# Patient Record
Sex: MALE | Race: WHITE | NOT HISPANIC OR LATINO | Employment: OTHER | URBAN - METROPOLITAN AREA
[De-identification: names, ages, dates, MRNs, and addresses within clinical notes are randomized per-mention and may not be internally consistent; named-entity substitution may affect disease eponyms.]

---

## 2023-09-15 ENCOUNTER — HOSPITAL ENCOUNTER (OUTPATIENT)
Dept: DATA CONVERSION | Facility: HOSPITAL | Age: 63
Discharge: HOME | End: 2023-09-15
Payer: MEDICARE

## 2023-09-15 DIAGNOSIS — E11.9 TYPE 2 DIABETES MELLITUS WITHOUT COMPLICATIONS (MULTI): ICD-10-CM

## 2023-09-15 DIAGNOSIS — E78.5 HYPERLIPIDEMIA, UNSPECIFIED: ICD-10-CM

## 2023-09-15 DIAGNOSIS — I10 ESSENTIAL (PRIMARY) HYPERTENSION: ICD-10-CM

## 2023-09-15 DIAGNOSIS — R73.01 IMPAIRED FASTING GLUCOSE: ICD-10-CM

## 2023-09-15 DIAGNOSIS — Z12.5 ENCOUNTER FOR SCREENING FOR MALIGNANT NEOPLASM OF PROSTATE: ICD-10-CM

## 2023-09-15 DIAGNOSIS — E03.9 HYPOTHYROIDISM, UNSPECIFIED: ICD-10-CM

## 2023-09-15 LAB
ALBUMIN SERPL-MCNC: 3.3 GM/DL (ref 3.5–5)
ALBUMIN/GLOB SERPL: 1.1 RATIO (ref 1.5–3)
ALP BLD-CCNC: 131 U/L (ref 35–125)
ALT SERPL-CCNC: 23 U/L (ref 5–40)
ANION GAP SERPL CALCULATED.3IONS-SCNC: 13 MMOL/L (ref 0–19)
APPEARANCE PLAS: NORMAL
AST SERPL-CCNC: 33 U/L (ref 5–40)
BACTERIA UR QL AUTO: NEGATIVE
BASOPHILS # BLD AUTO: 0.04 K/UL (ref 0–0.22)
BASOPHILS NFR BLD AUTO: 0.6 % (ref 0–1)
BILIRUB SERPL-MCNC: 1.2 MG/DL (ref 0.1–1.2)
BILIRUB UR QL STRIP.AUTO: NEGATIVE
BUN SERPL-MCNC: 18 MG/DL (ref 8–25)
BUN/CREAT SERPL: 13.8 RATIO (ref 8–21)
CALCIUM SERPL-MCNC: 9 MG/DL (ref 8.5–10.4)
CHLORIDE SERPL-SCNC: 103 MMOL/L (ref 97–107)
CHOLEST SERPL-MCNC: 149 MG/DL (ref 133–200)
CHOLEST/HDLC SERPL: 3.1 RATIO
CLARITY UR: CLEAR
CO2 SERPL-SCNC: 22 MMOL/L (ref 24–31)
COLOR SPUN FLD: NORMAL
COLOR UR: YELLOW
CREAT SERPL-MCNC: 1.3 MG/DL (ref 0.4–1.6)
DEPRECATED RDW RBC AUTO: 51.3 FL (ref 37–54)
DIFFERENTIAL METHOD BLD: ABNORMAL
EOSINOPHIL # BLD AUTO: 0.15 K/UL (ref 0–0.45)
EOSINOPHIL NFR BLD: 2.4 % (ref 0–3)
ERYTHROCYTE [DISTWIDTH] IN BLOOD BY AUTOMATED COUNT: 17.2 % (ref 11.7–15)
FASTING STATUS PATIENT QL REPORTED: NORMAL
GFR SERPL CREATININE-BSD FRML MDRD: 63 ML/MIN/1.73 M2
GLOBULIN SER-MCNC: 3.1 G/DL (ref 1.9–3.7)
GLUCOSE SERPL-MCNC: 122 MG/DL (ref 65–99)
GLUCOSE UR STRIP.AUTO-MCNC: NEGATIVE MG/DL
HBA1C MFR BLD: 4.8 % (ref 4–6)
HCT VFR BLD AUTO: 33.5 % (ref 41–50)
HDLC SERPL-MCNC: 48 MG/DL
HGB BLD-MCNC: 10.9 GM/DL (ref 13.5–16.5)
HGB UR QL STRIP.AUTO: 6 /HPF (ref 0–3)
HGB UR QL: NEGATIVE
HYALINE CASTS UR QL AUTO: 3 /LPF
IMM GRANULOCYTES # BLD AUTO: 0.02 K/UL (ref 0–0.1)
KETONES UR QL STRIP.AUTO: ABNORMAL
LDLC SERPL CALC-MCNC: 85 MG/DL (ref 65–130)
LEUKOCYTE ESTERASE UR QL STRIP.AUTO: NEGATIVE
LYMPHOCYTES # BLD AUTO: 0.64 K/UL (ref 1.2–3.2)
LYMPHOCYTES NFR BLD MANUAL: 10.3 % (ref 20–40)
MCH RBC QN AUTO: 26.8 PG (ref 26–34)
MCHC RBC AUTO-ENTMCNC: 32.5 % (ref 31–37)
MCV RBC AUTO: 82.3 FL (ref 80–100)
MONOCYTES # BLD AUTO: 0.72 K/UL (ref 0–0.8)
MONOCYTES NFR BLD MANUAL: 11.6 % (ref 0–8)
NEUTROPHILS # BLD AUTO: 4.63 K/UL
NEUTROPHILS # BLD AUTO: 4.63 K/UL (ref 1.8–7.7)
NEUTROPHILS.IMMATURE NFR BLD: 0.3 % (ref 0–1)
NEUTS SEG NFR BLD: 74.8 % (ref 50–70)
NITRITE UR QL STRIP.AUTO: NEGATIVE
NRBC BLD-RTO: 0 /100 WBC
PH UR STRIP.AUTO: 6 [PH] (ref 4.6–8)
PLATELET # BLD AUTO: 102 K/UL (ref 150–450)
PMV BLD AUTO: 12 CU (ref 7–12.6)
POTASSIUM SERPL-SCNC: 3 MMOL/L (ref 3.4–5.1)
PROT SERPL-MCNC: 6.4 G/DL (ref 5.9–7.9)
PROT UR STRIP.AUTO-MCNC: ABNORMAL MG/DL
PSA SERPL-MCNC: 0.1 NG/ML (ref 0–4.1)
RBC # BLD AUTO: 4.07 M/UL (ref 4.5–5.5)
REFLEX MICROSCOPIC (UA): ABNORMAL
SODIUM SERPL-SCNC: 138 MMOL/L (ref 133–145)
SP GR UR STRIP.AUTO: 1.02 (ref 1–1.03)
SQUAMOUS UR QL AUTO: ABNORMAL /HPF
T4 FREE SERPL-MCNC: 1.6 NG/DL (ref 0.9–1.7)
TRIGL SERPL-MCNC: 80 MG/DL (ref 40–150)
TSH SERPL DL<=0.05 MIU/L-ACNC: 4.51 MIU/L (ref 0.27–4.2)
UROBILINOGEN UR QL STRIP.AUTO: 2 MG/DL (ref 0–1)
WBC # BLD AUTO: 6.2 K/UL (ref 4.5–11)
WBC #/AREA URNS AUTO: 1 /HPF (ref 0–3)

## 2023-10-31 ENCOUNTER — TELEPHONE (OUTPATIENT)
Dept: PRIMARY CARE | Facility: CLINIC | Age: 63
End: 2023-10-31
Payer: MEDICARE

## 2023-10-31 DIAGNOSIS — G89.29 OTHER CHRONIC PAIN: ICD-10-CM

## 2023-10-31 DIAGNOSIS — I10 ESSENTIAL (PRIMARY) HYPERTENSION: ICD-10-CM

## 2023-10-31 RX ORDER — TRAMADOL HYDROCHLORIDE 50 MG/1
50 TABLET ORAL EVERY 12 HOURS PRN
COMMUNITY
Start: 2023-08-08 | End: 2023-10-31 | Stop reason: SDUPTHER

## 2023-10-31 RX ORDER — METOPROLOL TARTRATE 25 MG/1
25 TABLET, FILM COATED ORAL 2 TIMES DAILY
COMMUNITY
Start: 2023-07-17 | End: 2023-10-31 | Stop reason: SDUPTHER

## 2023-10-31 RX ORDER — FEBUXOSTAT 40 MG/1
40 TABLET, FILM COATED ORAL DAILY
COMMUNITY
End: 2023-10-31 | Stop reason: SDUPTHER

## 2023-10-31 NOTE — TELEPHONE ENCOUNTER
Rx refill  Febuxostat 40 mg  Metoprolol 25 mg  Tramadol 50 mg  Pharmacy- [facility] , [place]

## 2023-11-01 RX ORDER — METOPROLOL TARTRATE 25 MG/1
25 TABLET, FILM COATED ORAL 2 TIMES DAILY
Qty: 90 TABLET | Refills: 1 | Status: SHIPPED | OUTPATIENT
Start: 2023-11-01 | End: 2024-02-05 | Stop reason: SDUPTHER

## 2023-11-01 RX ORDER — FEBUXOSTAT 40 MG/1
40 TABLET, FILM COATED ORAL DAILY
Qty: 90 TABLET | Refills: 1 | Status: SHIPPED | OUTPATIENT
Start: 2023-11-01 | End: 2024-05-06 | Stop reason: SDUPTHER

## 2023-11-01 RX ORDER — TRAMADOL HYDROCHLORIDE 50 MG/1
50 TABLET ORAL EVERY 12 HOURS PRN
Qty: 15 TABLET | Refills: 0 | Status: SHIPPED | OUTPATIENT
Start: 2023-11-01 | End: 2023-11-02 | Stop reason: SDUPTHER

## 2023-11-02 RX ORDER — TRAMADOL HYDROCHLORIDE 50 MG/1
50 TABLET ORAL EVERY 12 HOURS PRN
Qty: 60 TABLET | Refills: 0 | Status: SHIPPED | OUTPATIENT
Start: 2023-11-02 | End: 2024-01-19 | Stop reason: SDUPTHER

## 2023-11-02 NOTE — TELEPHONE ENCOUNTER
I wrote it for 60 pills as we always do. Not sure how it got changed. Resent with correct number of pills. Please let him know.

## 2023-12-04 ENCOUNTER — TELEPHONE (OUTPATIENT)
Dept: PRIMARY CARE | Facility: CLINIC | Age: 63
End: 2023-12-04
Payer: MEDICARE

## 2023-12-04 DIAGNOSIS — I10 ESSENTIAL (PRIMARY) HYPERTENSION: ICD-10-CM

## 2023-12-04 DIAGNOSIS — E55.9 VITAMIN D DEFICIENCY: ICD-10-CM

## 2023-12-04 RX ORDER — SPIRONOLACTONE 50 MG/1
50 TABLET, FILM COATED ORAL 2 TIMES DAILY
COMMUNITY
End: 2023-12-04 | Stop reason: SDUPTHER

## 2023-12-04 RX ORDER — CHOLECALCIFEROL (VITAMIN D3) 10 MCG
800 TABLET ORAL DAILY
Qty: 180 TABLET | Refills: 0 | Status: SHIPPED | OUTPATIENT
Start: 2023-12-04 | End: 2024-03-06 | Stop reason: SDUPTHER

## 2023-12-04 RX ORDER — SPIRONOLACTONE 50 MG/1
50 TABLET, FILM COATED ORAL 2 TIMES DAILY
Qty: 90 TABLET | Refills: 0 | Status: SHIPPED | OUTPATIENT
Start: 2023-12-04 | End: 2024-01-19 | Stop reason: SDUPTHER

## 2023-12-04 RX ORDER — CHOLECALCIFEROL (VITAMIN D3) 10 MCG
400 TABLET ORAL
COMMUNITY
Start: 2023-08-28 | End: 2023-12-04 | Stop reason: SDUPTHER

## 2023-12-04 NOTE — TELEPHONE ENCOUNTER
Patient would like refills on the following    Spirolactone 50mg once daily  Vitamin D 400units    [facility]

## 2023-12-18 ENCOUNTER — TELEPHONE (OUTPATIENT)
Dept: PRIMARY CARE | Facility: CLINIC | Age: 63
End: 2023-12-18
Payer: MEDICARE

## 2023-12-18 DIAGNOSIS — I10 ESSENTIAL (PRIMARY) HYPERTENSION: ICD-10-CM

## 2023-12-18 RX ORDER — FUROSEMIDE 80 MG/1
80 TABLET ORAL DAILY
Qty: 90 TABLET | Refills: 0 | Status: SHIPPED | OUTPATIENT
Start: 2023-12-18 | End: 2024-03-20 | Stop reason: SDUPTHER

## 2023-12-18 RX ORDER — FUROSEMIDE 80 MG/1
80 TABLET ORAL DAILY
COMMUNITY
End: 2023-12-18 | Stop reason: SDUPTHER

## 2024-01-02 ENCOUNTER — TELEPHONE (OUTPATIENT)
Dept: PRIMARY CARE | Facility: CLINIC | Age: 64
End: 2024-01-02
Payer: MEDICARE

## 2024-01-02 DIAGNOSIS — K21.9 GASTROESOPHAGEAL REFLUX DISEASE, UNSPECIFIED WHETHER ESOPHAGITIS PRESENT: ICD-10-CM

## 2024-01-02 RX ORDER — OMEPRAZOLE 40 MG/1
40 CAPSULE, DELAYED RELEASE ORAL 2 TIMES DAILY
COMMUNITY
End: 2024-01-02 | Stop reason: SDUPTHER

## 2024-01-02 RX ORDER — OMEPRAZOLE 40 MG/1
40 CAPSULE, DELAYED RELEASE ORAL 2 TIMES DAILY
Qty: 180 CAPSULE | Refills: 0 | Status: SHIPPED | OUTPATIENT
Start: 2024-01-02 | End: 2024-04-05 | Stop reason: SDUPTHER

## 2024-01-08 ENCOUNTER — APPOINTMENT (OUTPATIENT)
Dept: PRIMARY CARE | Facility: CLINIC | Age: 64
End: 2024-01-08
Payer: MEDICARE

## 2024-01-19 ENCOUNTER — TELEPHONE (OUTPATIENT)
Dept: PRIMARY CARE | Facility: CLINIC | Age: 64
End: 2024-01-19
Payer: MEDICARE

## 2024-01-19 DIAGNOSIS — I10 ESSENTIAL (PRIMARY) HYPERTENSION: Primary | ICD-10-CM

## 2024-01-19 DIAGNOSIS — I10 ESSENTIAL (PRIMARY) HYPERTENSION: ICD-10-CM

## 2024-01-19 DIAGNOSIS — D64.9 ANEMIA, UNSPECIFIED TYPE: ICD-10-CM

## 2024-01-19 DIAGNOSIS — G89.29 OTHER CHRONIC PAIN: ICD-10-CM

## 2024-01-19 RX ORDER — TRAMADOL HYDROCHLORIDE 50 MG/1
50 TABLET ORAL EVERY 12 HOURS PRN
Qty: 30 TABLET | Refills: 0 | Status: SHIPPED | OUTPATIENT
Start: 2024-01-19 | End: 2024-02-15 | Stop reason: SDUPTHER

## 2024-01-19 RX ORDER — SPIRONOLACTONE 50 MG/1
50 TABLET, FILM COATED ORAL 2 TIMES DAILY
Qty: 90 TABLET | Refills: 1 | Status: SHIPPED | OUTPATIENT
Start: 2024-01-19

## 2024-01-19 NOTE — TELEPHONE ENCOUNTER
Rx Refill Request Telephone Encounter    Name:  Vishnu Hassan  :  [id number]  Medication Name:  Tramadol 50 mg, Spironolactone 50 mg            [facility]  Date of last appointment:    Date of next appointment:    Best number to reach patient:

## 2024-01-29 ENCOUNTER — APPOINTMENT (OUTPATIENT)
Dept: PRIMARY CARE | Facility: CLINIC | Age: 64
End: 2024-01-29
Payer: MEDICARE

## 2024-02-05 DIAGNOSIS — I10 ESSENTIAL (PRIMARY) HYPERTENSION: ICD-10-CM

## 2024-02-05 NOTE — TELEPHONE ENCOUNTER
Rx Refill Request Telephone Encounter    Name:  Vishnu Hassan  :  [id number]  Medication Name:  Metoprolol 25 mg            [facility]  Date of last appointment:    Date of next appointment:    Best number to reach patient:

## 2024-02-06 RX ORDER — METOPROLOL TARTRATE 25 MG/1
25 TABLET, FILM COATED ORAL 2 TIMES DAILY
Qty: 180 TABLET | Refills: 0 | Status: SHIPPED | OUTPATIENT
Start: 2024-02-06 | End: 2024-05-06 | Stop reason: SDUPTHER

## 2024-02-07 ENCOUNTER — LAB (OUTPATIENT)
Dept: LAB | Facility: LAB | Age: 64
End: 2024-02-07
Payer: MEDICARE

## 2024-02-07 DIAGNOSIS — I10 ESSENTIAL (PRIMARY) HYPERTENSION: ICD-10-CM

## 2024-02-07 DIAGNOSIS — D64.9 ANEMIA, UNSPECIFIED TYPE: ICD-10-CM

## 2024-02-07 LAB
ALBUMIN SERPL-MCNC: 3.2 G/DL (ref 3.5–5)
ALP BLD-CCNC: 145 U/L (ref 35–125)
ALT SERPL-CCNC: 19 U/L (ref 5–40)
ANION GAP SERPL CALC-SCNC: 13 MMOL/L
AST SERPL-CCNC: 33 U/L (ref 5–40)
BASOPHILS # BLD AUTO: 0.06 X10*3/UL (ref 0–0.1)
BASOPHILS NFR BLD AUTO: 1.4 %
BILIRUB SERPL-MCNC: 1.1 MG/DL (ref 0.1–1.2)
BUN SERPL-MCNC: 16 MG/DL (ref 8–25)
CALCIUM SERPL-MCNC: 8.7 MG/DL (ref 8.5–10.4)
CHLORIDE SERPL-SCNC: 103 MMOL/L (ref 97–107)
CO2 SERPL-SCNC: 24 MMOL/L (ref 24–31)
CREAT SERPL-MCNC: 1.2 MG/DL (ref 0.4–1.6)
EGFRCR SERPLBLD CKD-EPI 2021: 69 ML/MIN/1.73M*2
EOSINOPHIL # BLD AUTO: 0.21 X10*3/UL (ref 0–0.7)
EOSINOPHIL NFR BLD AUTO: 4.9 %
ERYTHROCYTE [DISTWIDTH] IN BLOOD BY AUTOMATED COUNT: 16.6 % (ref 11.5–14.5)
FERRITIN SERPL-MCNC: 29 NG/ML (ref 30–400)
GLUCOSE SERPL-MCNC: 123 MG/DL (ref 65–99)
HCT VFR BLD AUTO: 31.3 % (ref 41–52)
HGB BLD-MCNC: 9.3 G/DL (ref 13.5–17.5)
IMM GRANULOCYTES # BLD AUTO: 0.04 X10*3/UL (ref 0–0.7)
IMM GRANULOCYTES NFR BLD AUTO: 0.9 % (ref 0–0.9)
IRON SATN MFR SERPL: 11 % (ref 12–50)
IRON SERPL-MCNC: 42 UG/DL (ref 45–160)
LYMPHOCYTES # BLD AUTO: 0.53 X10*3/UL (ref 1.2–4.8)
LYMPHOCYTES NFR BLD AUTO: 12.3 %
MCH RBC QN AUTO: 26.1 PG (ref 26–34)
MCHC RBC AUTO-ENTMCNC: 29.7 G/DL (ref 32–36)
MCV RBC AUTO: 88 FL (ref 80–100)
MONOCYTES # BLD AUTO: 0.38 X10*3/UL (ref 0.1–1)
MONOCYTES NFR BLD AUTO: 8.8 %
NEUTROPHILS # BLD AUTO: 3.09 X10*3/UL (ref 1.2–7.7)
NEUTROPHILS NFR BLD AUTO: 71.7 %
NRBC BLD-RTO: 0 /100 WBCS (ref 0–0)
PLATELET # BLD AUTO: 102 X10*3/UL (ref 150–450)
POTASSIUM SERPL-SCNC: 3.8 MMOL/L (ref 3.4–5.1)
PROT SERPL-MCNC: 5.8 G/DL (ref 5.9–7.9)
RBC # BLD AUTO: 3.57 X10*6/UL (ref 4.5–5.9)
SODIUM SERPL-SCNC: 140 MMOL/L (ref 133–145)
TIBC SERPL-MCNC: 366 UG/DL (ref 228–428)
UIBC SERPL-MCNC: 324 UG/DL (ref 110–370)
WBC # BLD AUTO: 4.3 X10*3/UL (ref 4.4–11.3)

## 2024-02-07 PROCEDURE — 83540 ASSAY OF IRON: CPT

## 2024-02-07 PROCEDURE — 80053 COMPREHEN METABOLIC PANEL: CPT

## 2024-02-07 PROCEDURE — 85025 COMPLETE CBC W/AUTO DIFF WBC: CPT

## 2024-02-07 PROCEDURE — 83550 IRON BINDING TEST: CPT

## 2024-02-07 PROCEDURE — 36415 COLL VENOUS BLD VENIPUNCTURE: CPT

## 2024-02-07 PROCEDURE — 82728 ASSAY OF FERRITIN: CPT

## 2024-02-15 ENCOUNTER — OFFICE VISIT (OUTPATIENT)
Dept: PRIMARY CARE | Facility: CLINIC | Age: 64
End: 2024-02-15
Payer: MEDICARE

## 2024-02-15 VITALS
DIASTOLIC BLOOD PRESSURE: 58 MMHG | HEIGHT: 65 IN | SYSTOLIC BLOOD PRESSURE: 108 MMHG | HEART RATE: 80 BPM | BODY MASS INDEX: 25.83 KG/M2 | OXYGEN SATURATION: 99 % | TEMPERATURE: 98.1 F | WEIGHT: 155 LBS

## 2024-02-15 DIAGNOSIS — G89.29 OTHER CHRONIC PAIN: ICD-10-CM

## 2024-02-15 DIAGNOSIS — I10 ESSENTIAL (PRIMARY) HYPERTENSION: Primary | ICD-10-CM

## 2024-02-15 DIAGNOSIS — D64.9 ANEMIA, UNSPECIFIED TYPE: ICD-10-CM

## 2024-02-15 PROCEDURE — 99214 OFFICE O/P EST MOD 30 MIN: CPT

## 2024-02-15 PROCEDURE — 80349 CANNABINOIDS NATURAL: CPT

## 2024-02-15 PROCEDURE — 3074F SYST BP LT 130 MM HG: CPT

## 2024-02-15 PROCEDURE — 80365 DRUG SCREENING OXYCODONE: CPT

## 2024-02-15 PROCEDURE — 80368 SEDATIVE HYPNOTICS: CPT

## 2024-02-15 PROCEDURE — 80373 DRUG SCREENING TRAMADOL: CPT

## 2024-02-15 PROCEDURE — 80307 DRUG TEST PRSMV CHEM ANLYZR: CPT

## 2024-02-15 PROCEDURE — 80346 BENZODIAZEPINES1-12: CPT

## 2024-02-15 PROCEDURE — 82570 ASSAY OF URINE CREATININE: CPT

## 2024-02-15 PROCEDURE — 3078F DIAST BP <80 MM HG: CPT

## 2024-02-15 PROCEDURE — 80354 DRUG SCREENING FENTANYL: CPT

## 2024-02-15 PROCEDURE — 80361 OPIATES 1 OR MORE: CPT

## 2024-02-15 PROCEDURE — 80358 DRUG SCREENING METHADONE: CPT

## 2024-02-15 PROCEDURE — 1036F TOBACCO NON-USER: CPT

## 2024-02-15 RX ORDER — TRAMADOL HYDROCHLORIDE 50 MG/1
50 TABLET ORAL EVERY 12 HOURS PRN
Qty: 30 TABLET | Refills: 0 | Status: SHIPPED | OUTPATIENT
Start: 2024-02-15 | End: 2024-03-20 | Stop reason: SDUPTHER

## 2024-02-15 RX ORDER — FERROUS GLUCONATE 325 MG
38 TABLET ORAL
COMMUNITY

## 2024-02-15 ASSESSMENT — PATIENT HEALTH QUESTIONNAIRE - PHQ9
1. LITTLE INTEREST OR PLEASURE IN DOING THINGS: NOT AT ALL
SUM OF ALL RESPONSES TO PHQ9 QUESTIONS 1 AND 2: 0
2. FEELING DOWN, DEPRESSED OR HOPELESS: NOT AT ALL

## 2024-02-15 ASSESSMENT — LIFESTYLE VARIABLES: TOTAL SCORE: 3

## 2024-02-15 ASSESSMENT — PAIN SCALES - GENERAL: PAINLEVEL: 6

## 2024-02-15 NOTE — PROGRESS NOTES
Subjective   Patient ID: Vishnu Hassan is a 63 y.o. male who presents for Hypertension.    HPI     Vishnu presents for HTN follow up and chronic pain management.     HTN- Metoprolol 25mg BID, Lasix 80mg, spironolactone 50mg. Tolerating medications well. Labs reviewed, no electrolyte imbalances.  Denies change in vision, headache, or dizziness.   No complaints of chest pain, palpitations, or shortness of breath.    CHRONIC PAIN -Takes Tramadol 50mg PRN for chronic arthritic pain s/p knee and hip replacements. Had Rt hip fracture 1/2023. Takes 1 pills per day.Was previously taking 1-2 daily. Feels pain well controlled on current regimen. Allows him to stay active, when pain goes untreated has difficulty with ambulation and completing his everyday activities.  Prognosis: chronic/long term  Last Controlled meds contract - 5/2022, updated today    HLD- no meds  IFG- monitoring  Hypothyroidism  Gout- febuxostat 40mg, colchicine 0.6mg  Anemia- supplement qod  Alcoholic cirrhosis- Lasix 80mg, spironolactone 50mg, lactulose PRN. Overdue for appt, will schedule- he states he tried to schedule but Dr. Whitaker was unavailable, but will call again  Hx prostate cancer  Chronic pain- Tramadol 1-2 tabs daily  GERD- omeprazole  Vit D deficiency- supplement      SPECIALISTS:  Ortho, Dr. Rodriguez/Dr. Phillips  GI, Dr. Whitaker [facility]- Alcoholic cirrhosis  Renal, Dr. Malhotra- CKD    OARRS:  Shayna Bond, DARRIUS-CNP on 2/15/2024 10:32 AM  I have personally reviewed the OARRS report for Vishnu Hassan. I have considered the risks of abuse, dependence, addiction and diversion    Is the patient prescribed a combination of a benzodiazepine and opioid?  No    Last Urine Drug Screen / ordered today: Yes  Recent Results (from the past 8760 hour(s))   Screen Opiate/Opioid/Benzo Prescription Compliance    Collection Time: 02/15/24 11:11 AM   Result Value Ref Range    Creatinine, Urine Random 21.0 20.0 - 370.0 mg/dL    Amphetamine Screen, Urine  Presumptive Negative Presumptive Negative    Barbiturate Screen, Urine Presumptive Negative Presumptive Negative    Cannabinoid Screen, Urine Presumptive Positive (A) Presumptive Negative    Cocaine Metabolite Screen, Urine Presumptive Negative Presumptive Negative    PCP Screen, Urine Presumptive Negative Presumptive Negative     N/A      Controlled Substance Agreement:  Date of the Last Agreement: 2/15/2024  Reviewed Controlled Substance Agreement including but not limited to the benefits, risks, and alternatives to treatment with a Controlled Substance medication(s).    Opioids:  What is the patient's goal of therapy? Pain management  Is this being achieved with current treatment? yes    I have calculated the patient's Morphine Dose Equivalent (MED):   I have considered referral to Pain Management and/or a specialist, and do not feel it is necessary at this time.    I feel that it is clinically indicated to continue this current medication regimen after consideration of alternative therapies, and other non-opioid treatment.    Opioid Risk Screening:  Family History of Substance Abuse  Alcohol: 3 (2/15/2024 10:00 AM)  Illegal Drugs: 0 (2/15/2024 10:00 AM)  Prescription Drugs: 0 (2/15/2024 10:00 AM)    Personal History of Substance Abuse  Alcohol: 0 (2/15/2024 10:00 AM)  Illegal Drugs: 0 (2/15/2024 10:00 AM)  Prescription Drugs: 0 (2/15/2024 10:00 AM)    Patient Age (16-45)  Age (16-45): 0 (2/15/2024 10:00 AM)    History of Preadolescent Sexual Abuse  History of Preadolescent Sexual Abuse: .0 (2/15/2024 10:00 AM)    Psychological Disease  Attention Deficit Disorder, Obsessive Compulsive Disorder, Bipolar, Schizophrenia: 0 (2/15/2024 10:00 AM)  Depression: 0 (2/15/2024 10:00 AM)    Total Score  Total Score: 3 (2/15/2024 10:00 AM)        Pain Assessment:  6/10, generalized     Review of Systems   Constitutional:  Negative for activity change, appetite change, fatigue and unexpected weight change.   HENT:  Negative  "for hearing loss.    Eyes:  Negative for visual disturbance.   Respiratory:  Negative for chest tightness and shortness of breath.    Cardiovascular:  Negative for chest pain and palpitations.   Gastrointestinal:  Negative for constipation, diarrhea, nausea and vomiting.   Genitourinary:  Negative for difficulty urinating, hematuria and urgency.   Musculoskeletal:  Positive for arthralgias (chronic arthritic pain b/l hips and knees). Negative for myalgias.   Skin:  Negative for color change.   Neurological:  Negative for dizziness, tremors, weakness, light-headedness and headaches.           Objective   /58 (BP Location: Left arm)   Pulse 80   Temp 36.7 °C (98.1 °F) (Temporal)   Ht 1.651 m (5' 5\")   Wt 70.3 kg (155 lb)   SpO2 99%   BMI 25.79 kg/m²     Physical Exam  Vitals and nursing note reviewed.   Constitutional:       General: He is not in acute distress.     Appearance: Normal appearance. He is normal weight. He is not ill-appearing.   Eyes:      Extraocular Movements: Extraocular movements intact.      Conjunctiva/sclera: Conjunctivae normal.      Pupils: Pupils are equal, round, and reactive to light.   Neck:      Vascular: No carotid bruit.   Cardiovascular:      Rate and Rhythm: Normal rate and regular rhythm.      Pulses: Normal pulses.      Heart sounds: Normal heart sounds, S1 normal and S2 normal. No murmur heard.  Pulmonary:      Effort: Pulmonary effort is normal. No respiratory distress.      Breath sounds: Normal breath sounds and air entry.   Musculoskeletal:      Cervical back: Normal range of motion and neck supple. No rigidity or tenderness.      Right lower leg: No edema.      Left lower leg: No edema.   Lymphadenopathy:      Cervical: No cervical adenopathy.   Skin:     General: Skin is warm and dry.      Capillary Refill: Capillary refill takes less than 2 seconds.   Neurological:      General: No focal deficit present.      Mental Status: He is alert. Mental status is at " baseline.   Psychiatric:         Mood and Affect: Mood normal.         Behavior: Behavior normal. Behavior is cooperative.         Thought Content: Thought content normal.         Judgment: Judgment normal.         Assessment/Plan   Problem List Items Addressed This Visit             ICD-10-CM    Essential (primary) hypertension - Primary  Blood pressure well controlled.   Continue metoprolol for hypertension. Continue to monitor blood pressure.  Call if blood pressure consistently >140/90.  Non pharmacological interventions such as low salt, cardiac diet discussed.  Increase physical activity as able.  Stress reduction interventions discussed.  Discussed signs and symptoms of major cardiovascular event and need to present to the ED.   Reevaluate in 6 months.  Patient verbalizes understanding regarding plan of care and all questions answered.   I10    Chronic pain  Reviewed risks, benefits and expected treatment course.  Discussed goals of maintaining daily function while maintaining a manageable pain level to allow you to go about your daily activities effectively.  You may always have some level of pain, but our goal is to find a tolerable level for you without adding unnecessary dangerous medications.  Follow up in 3 months for re-evaluation.  G89.29    Relevant Medications    traMADol (Ultram) 50 mg tablet    Other Relevant Orders    Opiate/Opioid/Benzo Prescription Compliance    OOB Internal Tracking    THC (Marijuana), Urine, Confirmation     Anemia  Labs reviewed with patient.  Increase iron supplement to daily from QOD as discussed.  Explained intended effects, potential side effects, and schedule of dosages of the medication.  Follow up in 6 months.

## 2024-02-16 LAB
AMPHETAMINES UR QL SCN: ABNORMAL
BARBITURATES UR QL SCN: ABNORMAL
BZE UR QL SCN: ABNORMAL
CANNABINOIDS UR QL SCN: ABNORMAL
CREAT UR-MCNC: 21 MG/DL (ref 20–370)
PCP UR QL SCN: ABNORMAL

## 2024-02-18 ASSESSMENT — ENCOUNTER SYMPTOMS
WEAKNESS: 0
DIZZINESS: 0
COLOR CHANGE: 0
VOMITING: 0
FATIGUE: 0
ACTIVITY CHANGE: 0
NAUSEA: 0
CONSTIPATION: 0
TREMORS: 0
CHEST TIGHTNESS: 0
APPETITE CHANGE: 0
HEMATURIA: 0
DIFFICULTY URINATING: 0
MYALGIAS: 0
DIARRHEA: 0
ARTHRALGIAS: 1
LIGHT-HEADEDNESS: 0
UNEXPECTED WEIGHT CHANGE: 0
SHORTNESS OF BREATH: 0
PALPITATIONS: 0
HEADACHES: 0

## 2024-02-23 LAB
1OH-MIDAZOLAM UR CFM-MCNC: <25 NG/ML
6MAM UR CFM-MCNC: <25 NG/ML
7AMINOCLONAZEPAM UR CFM-MCNC: <25 NG/ML
A-OH ALPRAZ UR CFM-MCNC: <25 NG/ML
ALPRAZ UR CFM-MCNC: <25 NG/ML
CHLORDIAZEP UR CFM-MCNC: <25 NG/ML
CLONAZEPAM UR CFM-MCNC: <25 NG/ML
CODEINE UR CFM-MCNC: <50 NG/ML
DIAZEPAM UR CFM-MCNC: <25 NG/ML
EDDP UR CFM-MCNC: <25 NG/ML
FENTANYL UR CFM-MCNC: <2.5 NG/ML
HYDROCODONE CTO UR CFM-MCNC: <25 NG/ML
HYDROMORPHONE UR CFM-MCNC: <25 NG/ML
LORAZEPAM UR CFM-MCNC: <25 NG/ML
METHADONE UR CFM-MCNC: <25 NG/ML
MIDAZOLAM UR CFM-MCNC: <25 NG/ML
MORPHINE UR CFM-MCNC: <50 NG/ML
NORDIAZEPAM UR CFM-MCNC: <25 NG/ML
NORFENTANYL UR CFM-MCNC: <2.5 NG/ML
NORHYDROCODONE UR CFM-MCNC: <25 NG/ML
NOROXYCODONE UR CFM-MCNC: <25 NG/ML
NORTRAMADOL UR-MCNC: >1000 NG/ML
OXAZEPAM UR CFM-MCNC: <25 NG/ML
OXYCODONE UR CFM-MCNC: <25 NG/ML
OXYMORPHONE UR CFM-MCNC: <25 NG/ML
TEMAZEPAM UR CFM-MCNC: <25 NG/ML
TRAMADOL UR CFM-MCNC: >1000 NG/ML
ZOLPIDEM UR CFM-MCNC: <25 NG/ML
ZOLPIDEM UR-MCNC: <25 NG/ML

## 2024-02-24 LAB — CARBOXYTHC UR-MCNC: 33 NG/ML

## 2024-02-28 ENCOUNTER — TELEPHONE (OUTPATIENT)
Dept: PRIMARY CARE | Facility: CLINIC | Age: 64
End: 2024-02-28
Payer: MEDICARE

## 2024-03-06 ENCOUNTER — TELEPHONE (OUTPATIENT)
Dept: PRIMARY CARE | Facility: CLINIC | Age: 64
End: 2024-03-06
Payer: MEDICARE

## 2024-03-06 DIAGNOSIS — E55.9 VITAMIN D DEFICIENCY: ICD-10-CM

## 2024-03-06 RX ORDER — CHOLECALCIFEROL (VITAMIN D3) 10 MCG
800 TABLET ORAL DAILY
Qty: 180 TABLET | Refills: 0 | Status: SHIPPED | OUTPATIENT
Start: 2024-03-06 | End: 2024-06-04

## 2024-03-06 NOTE — TELEPHONE ENCOUNTER
Patient would like a refill on Vitamin D 400 units    Pharmacy: [facility] in [place]    Contact Number: Mobile

## 2024-03-20 ENCOUNTER — TELEPHONE (OUTPATIENT)
Dept: PRIMARY CARE | Facility: CLINIC | Age: 64
End: 2024-03-20
Payer: MEDICARE

## 2024-03-20 DIAGNOSIS — G89.29 OTHER CHRONIC PAIN: ICD-10-CM

## 2024-03-20 DIAGNOSIS — I10 ESSENTIAL (PRIMARY) HYPERTENSION: ICD-10-CM

## 2024-03-20 RX ORDER — FUROSEMIDE 80 MG/1
80 TABLET ORAL DAILY
Qty: 90 TABLET | Refills: 0 | Status: SHIPPED | OUTPATIENT
Start: 2024-03-20

## 2024-03-20 RX ORDER — TRAMADOL HYDROCHLORIDE 50 MG/1
50 TABLET ORAL EVERY 12 HOURS PRN
Qty: 30 TABLET | Refills: 0 | Status: SHIPPED | OUTPATIENT
Start: 2024-03-20 | End: 2024-04-22 | Stop reason: SDUPTHER

## 2024-03-20 NOTE — TELEPHONE ENCOUNTER
Rx Refill Request Telephone Encounter    Name:  Vishnu MOORE Sonya  :  [id number]  Medication Name:  Furosemide 80 mg. Tramadol 50 mg. (He is aware [facility] is out)            Specific Pharmacy location:  [facility]  Date of last appointment:    Date of next appointment:    Best number to reach patient:

## 2024-03-20 NOTE — TELEPHONE ENCOUNTER
I personally reviewed the OARRS report for this patient and found no concern for abuse, dependence, or diversion. I believe this medication is clinically appropriate for this patient. Refill sent per patient request.

## 2024-04-05 DIAGNOSIS — K21.9 GASTROESOPHAGEAL REFLUX DISEASE, UNSPECIFIED WHETHER ESOPHAGITIS PRESENT: ICD-10-CM

## 2024-04-05 NOTE — TELEPHONE ENCOUNTER
Refill : Omeprazole 40 mg    Generic Colcrys .6 mg quantity 30. It has to be quantity of 30 and generic to be covered .     [facility] in [place]

## 2024-04-08 RX ORDER — OMEPRAZOLE 40 MG/1
40 CAPSULE, DELAYED RELEASE ORAL 2 TIMES DAILY
Qty: 180 CAPSULE | Refills: 0 | Status: SHIPPED | OUTPATIENT
Start: 2024-04-08

## 2024-04-08 NOTE — TELEPHONE ENCOUNTER
Patient called  [phone] said Omeprazole is not at the pharmacy, Colcrys is not covered in [place] , he was getting it in [place]. Colchicine .6 mg is covered here per insurance, he Is out does not have a gout flare up now but in case he does, not sure if he needs to be taking it, he is aware [facility] is out

## 2024-04-09 DIAGNOSIS — G89.29 OTHER CHRONIC PAIN: ICD-10-CM

## 2024-04-09 NOTE — TELEPHONE ENCOUNTER
Patient called for a refill of COLCHICINE  0.6MG    PATIENT STATED HE WAS GETTING MEDICATION FROM [facility] BECAUSE IT WAS CHEAPER.    Pharmacy is [facility] in [place].    Patient can be reached at [phone].    Patient is aware that [facility] is out of the office.

## 2024-04-10 RX ORDER — COLCHICINE 0.6 MG/1
0.6 TABLET ORAL 2 TIMES DAILY
Qty: 20 TABLET | Refills: 0 | Status: SHIPPED | OUTPATIENT
Start: 2024-04-10 | End: 2024-04-22 | Stop reason: SDUPTHER

## 2024-04-18 ENCOUNTER — TELEPHONE (OUTPATIENT)
Dept: PRIMARY CARE | Facility: CLINIC | Age: 64
End: 2024-04-18
Payer: MEDICARE

## 2024-04-18 DIAGNOSIS — G89.29 OTHER CHRONIC PAIN: ICD-10-CM

## 2024-04-18 RX ORDER — TRAMADOL HYDROCHLORIDE 50 MG/1
50 TABLET ORAL EVERY 12 HOURS PRN
Qty: 30 TABLET | Refills: 0 | OUTPATIENT
Start: 2024-04-18 | End: 2024-05-18

## 2024-04-18 RX ORDER — COLCHICINE 0.6 MG/1
0.6 TABLET ORAL 2 TIMES DAILY
Qty: 20 TABLET | Refills: 0 | OUTPATIENT
Start: 2024-04-18 | End: 2024-04-28

## 2024-04-22 ENCOUNTER — TELEPHONE (OUTPATIENT)
Dept: PRIMARY CARE | Facility: CLINIC | Age: 64
End: 2024-04-22
Payer: MEDICARE

## 2024-04-22 DIAGNOSIS — G89.29 OTHER CHRONIC PAIN: ICD-10-CM

## 2024-04-22 RX ORDER — COLCHICINE 0.6 MG/1
0.6 TABLET ORAL 2 TIMES DAILY
Qty: 30 TABLET | Refills: 0 | Status: SHIPPED | OUTPATIENT
Start: 2024-04-22 | End: 2024-05-02

## 2024-04-22 RX ORDER — TRAMADOL HYDROCHLORIDE 50 MG/1
50 TABLET ORAL EVERY 12 HOURS PRN
Qty: 30 TABLET | Refills: 0 | Status: SHIPPED | OUTPATIENT
Start: 2024-04-22 | End: 2024-05-23 | Stop reason: SDUPTHER

## 2024-04-22 NOTE — TELEPHONE ENCOUNTER
Patient called regarding medication refill for:    TRAMADOL 50 MG ( Patient has 2 pills left)  COLCHICINE 0.6 MG ( Patient is out of pills)    Pharmacy is  in [place].    Patient called the pharmacy and stated the medication wasn't called to his Pharmacy. The 04-10-24 prescription for Colchicine 0.6 is  according to the patient and it was for 20 tablets and patient stated it should have been for (30),thirty tablets.    Patient can be reached [phone].

## 2024-04-22 NOTE — TELEPHONE ENCOUNTER
I have personally reviewed the OARRS report.  I have considered the risks of abuse, dependence, addiction, and diversion.  I believe that it is clinically appropriate for patient to be prescribed this medication.

## 2024-05-06 ENCOUNTER — TELEPHONE (OUTPATIENT)
Dept: PRIMARY CARE | Facility: CLINIC | Age: 64
End: 2024-05-06
Payer: MEDICARE

## 2024-05-06 DIAGNOSIS — G89.29 OTHER CHRONIC PAIN: ICD-10-CM

## 2024-05-06 DIAGNOSIS — I10 ESSENTIAL (PRIMARY) HYPERTENSION: ICD-10-CM

## 2024-05-06 RX ORDER — METOPROLOL TARTRATE 25 MG/1
25 TABLET, FILM COATED ORAL 2 TIMES DAILY
Qty: 180 TABLET | Refills: 0 | Status: SHIPPED | OUTPATIENT
Start: 2024-05-06 | End: 2024-08-04

## 2024-05-06 RX ORDER — FEBUXOSTAT 40 MG/1
40 TABLET, FILM COATED ORAL DAILY
Qty: 90 TABLET | Refills: 0 | Status: SHIPPED | OUTPATIENT
Start: 2024-05-06 | End: 2024-08-04

## 2024-05-13 ENCOUNTER — APPOINTMENT (OUTPATIENT)
Dept: PRIMARY CARE | Facility: CLINIC | Age: 64
End: 2024-05-13
Payer: MEDICARE

## 2024-05-15 ENCOUNTER — APPOINTMENT (OUTPATIENT)
Dept: PRIMARY CARE | Facility: CLINIC | Age: 64
End: 2024-05-15
Payer: MEDICARE

## 2024-05-23 DIAGNOSIS — G89.29 OTHER CHRONIC PAIN: ICD-10-CM

## 2024-05-23 RX ORDER — TRAMADOL HYDROCHLORIDE 50 MG/1
50 TABLET ORAL EVERY 12 HOURS PRN
Qty: 30 TABLET | Refills: 0 | Status: SHIPPED | OUTPATIENT
Start: 2024-05-23 | End: 2024-06-22

## 2024-05-23 NOTE — TELEPHONE ENCOUNTER
Refill Colchicine .6 mg 30 day supply, and Tramadol 50 mg sent to [facility] in [facility]. Patient has a few pills left of both. Patient has an appt for next week with [facility].

## 2024-05-24 ENCOUNTER — TELEPHONE (OUTPATIENT)
Dept: PRIMARY CARE | Facility: CLINIC | Age: 64
End: 2024-05-24
Payer: MEDICARE

## 2024-05-24 DIAGNOSIS — M1A.9XX0 CHRONIC GOUT WITHOUT TOPHUS, UNSPECIFIED CAUSE, UNSPECIFIED SITE: Primary | ICD-10-CM

## 2024-05-24 RX ORDER — COLCHICINE 0.6 MG/1
0.6 TABLET ORAL DAILY
Qty: 30 TABLET | Refills: 0 | Status: SHIPPED | OUTPATIENT
Start: 2024-05-24 | End: 2024-06-23

## 2024-05-24 NOTE — TELEPHONE ENCOUNTER
Patient called and requested a refill for Colchicine 0.6 mg - he only has 2 left. He said he requested a refill recently and nothing was called in. He is a former patient of Dr. Gomez's, and this medication can be found in his chart in [facility]. He has a med review appointment on 5/30/2024.  Pharmacy: [facility]  Patient phone #: [phone]

## 2024-05-30 ENCOUNTER — OFFICE VISIT (OUTPATIENT)
Dept: PRIMARY CARE | Facility: CLINIC | Age: 64
End: 2024-05-30
Payer: MEDICARE

## 2024-05-30 ENCOUNTER — TELEPHONE (OUTPATIENT)
Dept: PRIMARY CARE | Facility: CLINIC | Age: 64
End: 2024-05-30

## 2024-05-30 VITALS
HEART RATE: 77 BPM | OXYGEN SATURATION: 99 % | BODY MASS INDEX: 26.19 KG/M2 | WEIGHT: 157.4 LBS | DIASTOLIC BLOOD PRESSURE: 74 MMHG | SYSTOLIC BLOOD PRESSURE: 124 MMHG

## 2024-05-30 DIAGNOSIS — I10 ESSENTIAL (PRIMARY) HYPERTENSION: ICD-10-CM

## 2024-05-30 DIAGNOSIS — M17.0 PRIMARY OSTEOARTHRITIS OF BOTH KNEES: ICD-10-CM

## 2024-05-30 DIAGNOSIS — M16.0 PRIMARY OSTEOARTHRITIS OF BOTH HIPS: ICD-10-CM

## 2024-05-30 DIAGNOSIS — E55.9 VITAMIN D DEFICIENCY: ICD-10-CM

## 2024-05-30 DIAGNOSIS — Z12.5 SCREENING FOR PROSTATE CANCER: ICD-10-CM

## 2024-05-30 DIAGNOSIS — G89.29 OTHER CHRONIC PAIN: Primary | ICD-10-CM

## 2024-05-30 PROBLEM — D69.6 DISORDER INVOLVING THROMBOCYTOPENIA (CMS-HCC): Status: ACTIVE | Noted: 2019-12-03

## 2024-05-30 PROBLEM — E11.9 TYPE 2 DIABETES MELLITUS (MULTI): Status: ACTIVE | Noted: 2018-06-28

## 2024-05-30 PROBLEM — E66.9 OBESITY: Status: ACTIVE | Noted: 2018-06-28

## 2024-05-30 PROBLEM — I85.00 ESOPHAGEAL VARICES WITHOUT BLEEDING (MULTI): Status: ACTIVE | Noted: 2021-02-15

## 2024-05-30 PROBLEM — K21.9 GASTROESOPHAGEAL REFLUX DISEASE WITHOUT ESOPHAGITIS: Status: ACTIVE | Noted: 2019-12-03

## 2024-05-30 PROBLEM — Z96.649 FAILED TOTAL HIP ARTHROPLASTY, INITIAL ENCOUNTER (CMS-HCC): Status: ACTIVE | Noted: 2019-01-17

## 2024-05-30 PROBLEM — R73.03 PREDIABETES: Status: ACTIVE | Noted: 2020-08-06

## 2024-05-30 PROBLEM — E78.5 HYPERLIPIDEMIA: Status: ACTIVE | Noted: 2019-11-07

## 2024-05-30 PROBLEM — K70.30 ALCOHOLIC CIRRHOSIS (MULTI): Status: ACTIVE | Noted: 2018-06-28

## 2024-05-30 PROBLEM — T84.018A FAILED TOTAL HIP ARTHROPLASTY, INITIAL ENCOUNTER (CMS-HCC): Status: ACTIVE | Noted: 2019-01-17

## 2024-05-30 PROBLEM — M15.9 GENERALIZED OSTEOARTHRITIS: Status: ACTIVE | Noted: 2020-03-12

## 2024-05-30 PROBLEM — N18.30 STAGE 3 CHRONIC KIDNEY DISEASE (MULTI): Status: ACTIVE | Noted: 2019-01-10

## 2024-05-30 PROBLEM — D64.9 ANEMIA: Status: ACTIVE | Noted: 2018-06-27

## 2024-05-30 PROBLEM — E03.9 HYPOTHYROIDISM: Status: ACTIVE | Noted: 2018-06-28

## 2024-05-30 PROCEDURE — 3074F SYST BP LT 130 MM HG: CPT | Performed by: STUDENT IN AN ORGANIZED HEALTH CARE EDUCATION/TRAINING PROGRAM

## 2024-05-30 PROCEDURE — 99214 OFFICE O/P EST MOD 30 MIN: CPT | Performed by: STUDENT IN AN ORGANIZED HEALTH CARE EDUCATION/TRAINING PROGRAM

## 2024-05-30 PROCEDURE — 3061F NEG MICROALBUMINURIA REV: CPT | Performed by: STUDENT IN AN ORGANIZED HEALTH CARE EDUCATION/TRAINING PROGRAM

## 2024-05-30 PROCEDURE — 3078F DIAST BP <80 MM HG: CPT | Performed by: STUDENT IN AN ORGANIZED HEALTH CARE EDUCATION/TRAINING PROGRAM

## 2024-05-30 ASSESSMENT — PATIENT HEALTH QUESTIONNAIRE - PHQ9
2. FEELING DOWN, DEPRESSED OR HOPELESS: NOT AT ALL
SUM OF ALL RESPONSES TO PHQ9 QUESTIONS 1 AND 2: 0
1. LITTLE INTEREST OR PLEASURE IN DOING THINGS: NOT AT ALL

## 2024-05-30 ASSESSMENT — ENCOUNTER SYMPTOMS
HEADACHES: 0
SHORTNESS OF BREATH: 0
FEVER: 0
PALPITATIONS: 0
CHILLS: 0

## 2024-05-30 ASSESSMENT — LIFESTYLE VARIABLES: TOTAL SCORE: 6

## 2024-05-30 ASSESSMENT — PAIN SCALES - GENERAL: PAINLEVEL: 6

## 2024-05-30 NOTE — PROGRESS NOTES
GENERAL PROGRESS NOTE    Chief Complaint   Patient presents with    Follow-up     Tramadol follow up        HPI  Vishnu Hassan is a 63 y.o. male presents today for medication review.     #CHRONIC PAIN - hips, knees s/p replacements. Rt hip fracture 1/2023. Continues on tramadol 50mg one tablet daily. Pain well controlled on this regimen and is able to maintain daily activities and independence with ADL's. When he does not take medication he has difficulty ambulating and cannot complete daily tasks. `    OARRS:  Eusebia Samuels MD on 5/30/2024  2:23 PM  I have personally reviewed the OARRS report for Vishnu Hassan. I have considered the risks of abuse, dependence, addiction and diversion    Is the patient prescribed a combination of a benzodiazepine and opioid?  No    Last Urine Drug Screen / ordered today: No  Recent Results (from the past 8760 hour(s))   Confirmation Opiate/Opioid/Benzo Prescription Compliance    Collection Time: 02/15/24 11:11 AM   Result Value Ref Range    Clonazepam <25 <25 ng/mL    7-Aminoclonazepam <25 <25 ng/mL    Alprazolam <25 <25 ng/mL    Alpha-Hydroxyalprazolam <25 <25 ng/mL    Midazolam <25 <25 ng/mL    Alpha-Hydroxymidazolam <25 <25 ng/mL    Chlordiazepoxide <25 <25 ng/mL    Diazepam <25 <25 ng/mL    Nordiazepam <25 <25 ng/mL    Temazepam <25 <25 ng/mL    Oxazepam <25 <25 ng/mL    Lorazepam <25 <25 ng/mL    Methadone <25 <25 ng/mL    EDDP <25 <25 ng/mL    6-Acetylmorphine <25 <25 ng/mL    Codeine <50 <50 ng/mL    Hydrocodone <25 <25 ng/mL    Hydromorphone <25 <25 ng/mL    Morphine  <50 <50 ng/mL    Norhydrocodone <25 <25 ng/mL    Noroxycodone <25 <25 ng/mL    Oxycodone <25 <25 ng/mL    Oxymorphone <25 <25 ng/mL    Fentanyl <2.5 <2.5 ng/mL    Norfentanyl <2.5 <2.5 ng/mL    Tramadol >1,000 (H) <50 ng/mL    O-Desmethyltramadol >1,000 (H) <50 ng/mL    Zolpidem <25 <25  ng/mL    Zolpidem Metabolite (ZCA) <25 <25 ng/mL   Screen Opiate/Opioid/Benzo Prescription Compliance    Collection Time: 02/15/24 11:11 AM   Result Value Ref Range    Creatinine, Urine Random 21.0 20.0 - 370.0 mg/dL    Amphetamine Screen, Urine Presumptive Negative Presumptive Negative    Barbiturate Screen, Urine Presumptive Negative Presumptive Negative    Cannabinoid Screen, Urine Presumptive Positive (A) Presumptive Negative    Cocaine Metabolite Screen, Urine Presumptive Negative Presumptive Negative    PCP Screen, Urine Presumptive Negative Presumptive Negative     Results are as expected.         Controlled Substance Agreement:  Date of the Last Agreement: 2/15/24    Opioids:  What is the patient's goal of therapy? Decrease pain and get through daily functions  Is this being achieved with current treatment? Yes but does feel he is slowing down some     I have calculated the patient's Morphine Dose Equivalent (MED):   I have considered referral to Pain Management and/or a specialist, and do not feel it is necessary at this time.    I feel that it is clinically indicated to continue this current medication regimen after consideration of alternative therapies, and other non-opioid treatment.    Opioid Risk Screening:  Family History of Substance Abuse  Alcohol: 3 (5/30/2024  2:00 PM)  Illegal Drugs: 0 (5/30/2024  2:00 PM)  Prescription Drugs: 0 (5/30/2024  2:00 PM)    Personal History of Substance Abuse  Alcohol: 3 (5/30/2024  2:00 PM)  Illegal Drugs: 0 (5/30/2024  2:00 PM)  Prescription Drugs: 0 (5/30/2024  2:00 PM)    Patient Age (16-45)  Age (16-45): 0 (5/30/2024  2:00 PM)    History of Preadolescent Sexual Abuse  History of Preadolescent Sexual Abuse: .0 (5/30/2024  2:00 PM)    Psychological Disease  Attention Deficit Disorder, Obsessive Compulsive Disorder, Bipolar, Schizophrenia: 0 (5/30/2024  2:00 PM)  Depression: 0 (5/30/2024  2:00 PM)    Total Score  Total Score: 6 (5/30/2024  2:00 PM)    Total Score  Risk Category  TOTAL SCORE CATEGORY: Moderate Risk (4-7) (5/30/2024  2:00 PM)        Pain Assessment:  Analgesia  What was your pain level on average during the past week?: 7  What was your pain level at its worst during the past week?: 9  What percentage of your pain has been relieved during the past week?: 35 %  Is the amount of pain relief you are now obtaining from your current pain relievers enough to make a real difference in your life?: Y    Activities of Daily Living  Physical Functioning: Better        Vital signs   /74 (BP Location: Left arm)   Pulse 77   Wt 71.4 kg (157 lb 6.4 oz)   SpO2 99%   BMI 26.19 kg/m²      Current Outpatient Medications   Medication Sig Dispense Refill    colchicine 0.6 mg tablet Take 1 tablet (0.6 mg) by mouth once daily. 30 tablet 0    febuxostat (Uloric) 40 mg tablet Take 1 tablet (40 mg) by mouth once daily. 90 tablet 0    ferrous gluconate (Fergon) 324 (38 Fe) mg tablet Take 1 tablet (38 mg of iron) by mouth once daily with breakfast.      furosemide (Lasix) 80 mg tablet Take 1 tablet (80 mg) by mouth once daily. 90 tablet 0    metoprolol tartrate (Lopressor) 25 mg tablet Take 1 tablet (25 mg) by mouth 2 times a day. 180 tablet 0    omeprazole (PriLOSEC) 40 mg DR capsule Take 1 capsule (40 mg) by mouth 2 times a day. 180 capsule 0    spironolactone (Aldactone) 50 mg tablet Take 1 tablet (50 mg) by mouth 2 times a day. 90 tablet 1    traMADol (Ultram) 50 mg tablet Take 1 tablet (50 mg) by mouth every 12 hours if needed for moderate pain (4 - 6) or severe pain (7 - 10). 30 tablet 0    Vitamin D3 10 mcg (400 unit) tablet Take 2 tablets (20 mcg) by mouth once daily. 180 tablet 0     No current facility-administered medications for this visit.       Review of Systems   Constitutional:  Negative for chills and fever.   Eyes:  Negative for visual disturbance.   Respiratory:  Negative for shortness of breath.    Cardiovascular:  Negative for chest pain, palpitations and leg  swelling.   Neurological:  Negative for headaches.        Physical Exam  Nursing notes and vitals reviewed  General appearance - AAOx3, non distressed  CVS - Warm and well perfused  RESP - No respiratory distress  PSYCH - Normal thought content, fluent and appropriate speech      Assessment/Plan   1. Other chronic pain  2. Primary osteoarthritis of both hips  3. Primary osteoarthritis of both knees  Reviewed risks, benefits, and expected treatment course. Goals of treatment reviewed.   Dosing and appropriate use discussed  Precautions discussed  No red flags identified  OARRS reviewed, controlled med contract up to date  Return 3 months        Eusebia Samuels MD  05/30/24  2:30 PM

## 2024-06-21 ENCOUNTER — TELEPHONE (OUTPATIENT)
Dept: PRIMARY CARE | Facility: CLINIC | Age: 64
End: 2024-06-21
Payer: MEDICARE

## 2024-06-21 DIAGNOSIS — G89.29 OTHER CHRONIC PAIN: ICD-10-CM

## 2024-06-21 DIAGNOSIS — I10 ESSENTIAL (PRIMARY) HYPERTENSION: ICD-10-CM

## 2024-06-21 RX ORDER — FUROSEMIDE 80 MG/1
80 TABLET ORAL DAILY
Qty: 90 TABLET | Refills: 0 | Status: SHIPPED | OUTPATIENT
Start: 2024-06-21

## 2024-06-21 RX ORDER — TRAMADOL HYDROCHLORIDE 50 MG/1
50 TABLET ORAL EVERY 12 HOURS PRN
Qty: 30 TABLET | Refills: 0 | Status: SHIPPED | OUTPATIENT
Start: 2024-06-21 | End: 2024-07-21

## 2024-06-21 NOTE — TELEPHONE ENCOUNTER
Rx Refill Request Telephone Encounter    Name:  Vishnu Hassan  :  [id number]  Medication Name:   Lactulose, Tramadol, Furosemide, has 1 week left of all            Specific Pharmacy location:   [facility], [place]  Date of last appointment:    Date of next appointment:    Best number to reach patient:

## 2024-06-21 NOTE — TELEPHONE ENCOUNTER
I think the lactulose was probably coming form his gastroenterologist. Please find out from pt dose and how he is taking. Please also remind pt he needs to schedule to see his GI Dr. Whitaker, looks like he is overdue for an appt with him.

## 2024-06-24 NOTE — TELEPHONE ENCOUNTER
Spoke with patient at this time he states he has been taking lactulose 10g/15 mL..10 g by mouth twice daily. He states is on the waiting list to be seen with GI.   Patient states he has restarted his colchicine because when he stopped taking it, his legs swelled up and he had pain, so he is taking colchicine again and needs a refill.

## 2024-06-28 ENCOUNTER — TELEPHONE (OUTPATIENT)
Dept: PRIMARY CARE | Facility: CLINIC | Age: 64
End: 2024-06-28
Payer: MEDICARE

## 2024-06-28 NOTE — TELEPHONE ENCOUNTER
Patient called [phone] e went to [facility] 6[facility] then [place] to [facility] is being released today he fell on  right hip no FX,  will be dong physical therapy, wanted to let Dr Samuels know they did a colonoscopy and Endoscopy that she wanted him to have done,  he did not want to make an appointment as he does not know what his restrictions are

## 2024-07-01 ENCOUNTER — DOCUMENTATION (OUTPATIENT)
Dept: PRIMARY CARE | Facility: CLINIC | Age: 64
End: 2024-07-01
Payer: MEDICARE

## 2024-07-01 ENCOUNTER — PATIENT OUTREACH (OUTPATIENT)
Dept: PRIMARY CARE | Facility: CLINIC | Age: 64
End: 2024-07-01
Payer: MEDICARE

## 2024-07-01 ENCOUNTER — TELEPHONE (OUTPATIENT)
Dept: PRIMARY CARE | Facility: CLINIC | Age: 64
End: 2024-07-01
Payer: MEDICARE

## 2024-07-01 NOTE — TELEPHONE ENCOUNTER
Called and spoke to Irma from [facility], informed and she verbalized understanding. Please reach out to patient to schedule a hospital follow up, thank you

## 2024-07-01 NOTE — TELEPHONE ENCOUNTER
JT FROM [facility] WOULD LIKE VERBAL ORDERS FOR SKILLED NURSING, OT AND PT SO PATIENT COULD START HOME CARE    JT CAN BE REACHED [phone]

## 2024-07-01 NOTE — PROGRESS NOTES
Discharge Facility:   [facility]     Discharge Diagnosis:    Visit Diagnoses  Diagnosis   Microcytic anemia - Primary   Iron deficiency anemia, unspecified    Iron deficiency anemia due to chronic blood loss   Iron deficiency anemia secondary to blood loss (chronic)    ROGER (acute kidney injury) (HCC)   Acute kidney failure, unspecified    Alcoholic cirrhosis (HCC)   Alcoholic cirrhosis of liver    Malnutrition of moderate degree (HCC)   Malnutrition of moderate degree          Admission Date:  6/25/2024   Discharge Date:  6/28/2024     PCP Appointment Date: Tasked to office     Specialist Appointment Date:     [facility] , spoke with CRISTIAN whom states they were not notified patient was DC home, they will start working on a SOC date now       GI awaiting appt.     Hospital Encounter and Summary: Linked     See discharge assessment below for further details     Engagement  Call Start Time: 1137 (7/1/2024 11:37 AM)    Medications  Medications reviewed with patient/caregiver?: Yes (7/1/2024 11:37 AM)  Is the patient having any side effects they believe may be caused by any medication additions or changes?: No (7/1/2024 11:37 AM)  Does the patient have all medications ordered at discharge?: -- (Patient sttaes he has reviewde medication list and following as instructed) (7/1/2024 11:37 AM)  Care Management Interventions: No intervention needed (7/1/2024 11:37 AM)  Prescription Comments: Ordered Prescriptions  Prescription Sig Dispensed Refills Start Date End Date  furosemide (LASIX) 80 mg tablet   Take 0.5 tablets by mouth once daily.   0 06/28/2024    spironolactone (ALDACTONE) 50 mg tablet   Take 1 tablet by mouth once daily.   0 06/28/2024    ferrous gluconate 324 mg (37.5 mg iron) tablet   Take 1 tablet by mouth once daily.   0 06/28/2024 (7/1/2024 11:37 AM)  Is the patient taking all medications as directed (includes completed medication regime)?: -- (Patient states has all meds) (7/1/2024 11:37 AM)  Care  Management Interventions: Provided patient education (7/1/2024 11:37 AM)  Medication Comments: Med list upon DC , cholecalciferol (VITAMIN D-3) 400 unit tab   Take 400 Units by mouth twice daily.   0      febuxostat (ULORIC) 40 mg tab   Take 40 mg by mouth once daily.   0      metoprolol tartrate, short acting, (LOPRESSOR) 25 mg tablet   Take 25 mg by mouth twice daily.    0 11/20/2018    colchicine 0.6 mg tablet   Take 0.6 mg by mouth once daily.    0      lactulose 10 gram/15 mL solution   take 15 ml by mouth twice daily   2700 mL   0 06/28/2024    ferrous gluconate 324 mg (37.5 mg iron) tablet   Take 1 tablet by mouth once daily.   0 06/28/2024    spironolactone (ALDACTONE) 50 mg tablet   Take 1 tablet by mouth once daily.   0 06/28/2024    furosemide (LASIX) 80 mg tablet   Take 0.5 tablets by mouth once daily.   0 06/28/2024 (7/1/2024 11:37 AM)    Appointments  Does the patient have a primary care provider?: Yes (7/1/2024 11:37 AM)  Care Management Interventions: Educated patient on importance of making appointment (Tasked to office) (7/1/2024 11:37 AM)  Care Management Interventions: Advised to schedule with specialist (GI awaiting call) (7/1/2024 11:37 AM)    Self Management  What is the home health agency?: [facility] , in process as of today not notified of pt DC home (7/1/2024 11:37 AM)  Has home health visited the patient within 72 hours of discharge?: No (7/1/2024 11:37 AM)  What Durable Medical Equipment (DME) was ordered?: NA (7/1/2024 11:37 AM)    Patient Teaching  Does the patient have access to their discharge instructions?: Yes (7/1/2024 11:37 AM)  Care Management Interventions: Reviewed instructions with patient (7/1/2024 11:37 AM)  What is the patient's perception of their health status since discharge?: Improving (7/1/2024 11:37 AM)  Is the patient/caregiver able to teach back the hierarchy of who to call/visit for symptoms/problems? PCP, Specialist, Home Health nurse, Urgent Care, ED, 911: Yes  (7/1/2024 11:37 AM)  Patient/Caregiver Education Comments: Patient aware to call providers for any questions concerns or change in condition (7/1/2024 11:37 AM)

## 2024-07-05 ENCOUNTER — TELEPHONE (OUTPATIENT)
Dept: PRIMARY CARE | Facility: CLINIC | Age: 64
End: 2024-07-05
Payer: MEDICARE

## 2024-07-05 NOTE — TELEPHONE ENCOUNTER
Gideon from [facility] is calling in to let you know that patient will have physical therapy twice a week for the next three weeks.

## 2024-07-10 ENCOUNTER — APPOINTMENT (OUTPATIENT)
Dept: PRIMARY CARE | Facility: CLINIC | Age: 64
End: 2024-07-10
Payer: MEDICARE

## 2024-07-10 VITALS
SYSTOLIC BLOOD PRESSURE: 110 MMHG | WEIGHT: 162 LBS | DIASTOLIC BLOOD PRESSURE: 60 MMHG | HEART RATE: 74 BPM | RESPIRATION RATE: 18 BRPM | BODY MASS INDEX: 26.96 KG/M2 | OXYGEN SATURATION: 100 %

## 2024-07-10 DIAGNOSIS — Z09 HOSPITAL DISCHARGE FOLLOW-UP: Primary | ICD-10-CM

## 2024-07-10 DIAGNOSIS — D64.9 ANEMIA, UNSPECIFIED TYPE: ICD-10-CM

## 2024-07-10 DIAGNOSIS — K70.30 ALCOHOLIC CIRRHOSIS, UNSPECIFIED WHETHER ASCITES PRESENT (MULTI): ICD-10-CM

## 2024-07-10 DIAGNOSIS — I10 ESSENTIAL (PRIMARY) HYPERTENSION: ICD-10-CM

## 2024-07-10 PROCEDURE — 3078F DIAST BP <80 MM HG: CPT | Performed by: STUDENT IN AN ORGANIZED HEALTH CARE EDUCATION/TRAINING PROGRAM

## 2024-07-10 PROCEDURE — 99495 TRANSJ CARE MGMT MOD F2F 14D: CPT | Performed by: STUDENT IN AN ORGANIZED HEALTH CARE EDUCATION/TRAINING PROGRAM

## 2024-07-10 PROCEDURE — 3061F NEG MICROALBUMINURIA REV: CPT | Performed by: STUDENT IN AN ORGANIZED HEALTH CARE EDUCATION/TRAINING PROGRAM

## 2024-07-10 PROCEDURE — 3074F SYST BP LT 130 MM HG: CPT | Performed by: STUDENT IN AN ORGANIZED HEALTH CARE EDUCATION/TRAINING PROGRAM

## 2024-07-10 RX ORDER — SPIRONOLACTONE 50 MG/1
50 TABLET, FILM COATED ORAL DAILY
Qty: 90 TABLET | Refills: 1 | Status: SHIPPED | OUTPATIENT
Start: 2024-07-10

## 2024-07-10 ASSESSMENT — ENCOUNTER SYMPTOMS
OCCASIONAL FEELINGS OF UNSTEADINESS: 1
DEPRESSION: 0
LOSS OF SENSATION IN FEET: 0

## 2024-07-10 ASSESSMENT — PAIN SCALES - GENERAL: PAINLEVEL: 7

## 2024-07-10 NOTE — PROGRESS NOTES
"Patient: Vishnu Hassan  : 1960  PCP: Eusebia Samuels MD  MRN: [id number]  Program: Transitional Care Management  Status: Enrolled  Effective Dates: 2024 - present  Responsible Staff: Deborah Saldana  Social Determinants to be Addressed: Alcohol Use, Financial Resource Strain, Physical Activity, Social Connections, Stress      Vishnu Hassan is a 63 y.o. male presenting today for follow-up after being discharged from the hospital 11 days ago. The main problem requiring admission was anemia, cirrhosis. The discharge summary and/or Transitional Care Management documentation was reviewed. Medication reconciliation was performed as indicated via the \"Mane as Reviewed\" timestamp.     Vishnu Hassan was contacted by Transitional Care Management services two days after his discharge. This encounter and supporting documentation was reviewed.    Admitted  after presenting to ED after a fall. Was at home and   tripped on area rug and went down. Was unable to get back up. Went to the ED and imaging was done. No hip fracture. Was found to be anemic with hemoglobin of 7. Received blood transfusion. Had EGD and colonoscopy. Was found to have multiple polyps and esophageal varices.   Platelets were 88. Also had paracentesis. 1.6L fluid was removed from abdominal ascites. Was recommend SNF but pt declined opting for home with [facility]. He has follow up blood work scheduled for later this week. Will have capsule endoscopy 24.     Has been doing well since discharge. Receiving home PT which is going well. Has not been drinking EtOH. Was drinking rarely previously and now not at all.        Review of Systems   Constitutional:  Negative for chills and fever.   Eyes:  Negative for visual disturbance.   Respiratory:  Negative for shortness of breath.    Cardiovascular:  Negative for chest pain, palpitations and leg swelling.   Gastrointestinal:  Negative for abdominal pain, constipation and diarrhea. "   Neurological:  Negative for headaches.       /60 (BP Location: Left arm, Patient Position: Sitting, BP Cuff Size: Large adult)   Pulse 74   Resp 18   Wt 73.5 kg (162 lb)   SpO2 100%   BMI 26.96 kg/m²     Physical Exam  Constitutional:       Appearance: Normal appearance.   Cardiovascular:      Rate and Rhythm: Normal rate and regular rhythm.      Heart sounds: Normal heart sounds. No murmur heard.  Pulmonary:      Effort: Pulmonary effort is normal.      Breath sounds: Normal breath sounds. No wheezing, rhonchi or rales.   Abdominal:      Tenderness: There is no abdominal tenderness. There is no guarding or rebound.   Musculoskeletal:      Right lower leg: No edema.      Left lower leg: No edema.   Neurological:      Mental Status: He is alert.         The complexity of medical decision making for this patient's transitional care is moderate.    Assessment/Plan   1. Hospital discharge follow-up  2. Essential (primary) hypertension  - spironolactone (Aldactone) 50 mg tablet; Take 1 tablet (50 mg) by mouth once daily.  Dispense: 90 tablet; Refill: 1  3. Anemia, unspecified type  4. Alcoholic cirrhosis, unspecified whether ascites present (Multi)    Hospital records reviewed and discussed.   Medication list reviewed and updated.   No acute concerns,   Follow up plan reviewed, he is aware of scheduled follow up appointments.

## 2024-07-11 ENCOUNTER — PATIENT OUTREACH (OUTPATIENT)
Dept: PRIMARY CARE | Facility: CLINIC | Age: 64
End: 2024-07-11
Payer: MEDICARE

## 2024-07-11 NOTE — PROGRESS NOTES
Unable to reach patient for call back after patient's follow up appointment with PCP on 7/11/2024 , I will place CCM referral as patient  could benefit from services     LV with call back number for patient to call if needed   If no voicemail available call attempts x 2 were made to contact the patient to assist with any questions or concerns patient may have.     L/M encouraged patient to call providers for any questions concerns or change in condition

## 2024-07-12 ENCOUNTER — DOCUMENTATION (OUTPATIENT)
Dept: PRIMARY CARE | Facility: CLINIC | Age: 64
End: 2024-07-12
Payer: MEDICARE

## 2024-07-22 ENCOUNTER — TELEPHONE (OUTPATIENT)
Dept: PRIMARY CARE | Facility: CLINIC | Age: 64
End: 2024-07-22
Payer: MEDICARE

## 2024-07-22 DIAGNOSIS — K21.9 GASTROESOPHAGEAL REFLUX DISEASE, UNSPECIFIED WHETHER ESOPHAGITIS PRESENT: ICD-10-CM

## 2024-07-22 DIAGNOSIS — G89.29 OTHER CHRONIC PAIN: ICD-10-CM

## 2024-07-22 RX ORDER — OMEPRAZOLE 40 MG/1
40 CAPSULE, DELAYED RELEASE ORAL 2 TIMES DAILY
Qty: 180 CAPSULE | Refills: 0 | Status: SHIPPED | OUTPATIENT
Start: 2024-07-22

## 2024-07-22 RX ORDER — TRAMADOL HYDROCHLORIDE 50 MG/1
50 TABLET ORAL EVERY 12 HOURS PRN
Qty: 30 TABLET | Refills: 0 | Status: SHIPPED | OUTPATIENT
Start: 2024-07-22 | End: 2024-08-21

## 2024-07-22 NOTE — TELEPHONE ENCOUNTER
OMEPRAZOLE 40MG  TRAMADOL 50MG (OUT OF MEDICATION)    [facility]

## 2024-07-24 ENCOUNTER — PATIENT OUTREACH (OUTPATIENT)
Dept: PRIMARY CARE | Facility: CLINIC | Age: 64
End: 2024-07-24
Payer: MEDICARE

## 2024-07-24 DIAGNOSIS — M17.0 PRIMARY OSTEOARTHRITIS OF BOTH KNEES: ICD-10-CM

## 2024-07-24 DIAGNOSIS — I10 ESSENTIAL (PRIMARY) HYPERTENSION: ICD-10-CM

## 2024-07-24 DIAGNOSIS — G89.29 OTHER CHRONIC PAIN: ICD-10-CM

## 2024-07-24 ASSESSMENT — ENCOUNTER SYMPTOMS
SHORTNESS OF BREATH: 0
ABDOMINAL PAIN: 0
FEVER: 0
PALPITATIONS: 0
CONSTIPATION: 0
HEADACHES: 0
CHILLS: 0
DIARRHEA: 0

## 2024-07-24 NOTE — PROGRESS NOTES
Call placed to Vishnu parkinson open for [facility]. History of HTN, pain, alcoholic cirrhosis, anemia, GERD and CKD. He stated he is slowly healing from his hip injury. He does have some pain but says the most concerning thing for him is making sure he doesn't fall again. He uses a cane and is cautious with walking. He also has swelling around both knees once he is up and moving which makes it more difficult. He states that four days ago he started noticing some testicular swelling. I advised him to make an appointment with Dr. Samuels. Education provided on case management, agreed to monthly calls. Contact information provided.

## 2024-07-24 NOTE — PROGRESS NOTES
Call regarding  F/U along with 7/10/2024 PCP F/U , patient awaiting  GI F/U will call office again as previous appt. Was cancelled. Patient states taking medications as directed , [facility] continues in home.     At time of outreach call the patient feels as if their condition has improved since last visit.    Reviewed the PCP appointment with the pt and addressed any questions or concerns.     Patient encouraged to call providers for any questions concerns or change in condition

## 2024-07-26 ENCOUNTER — OFFICE VISIT (OUTPATIENT)
Dept: PRIMARY CARE | Facility: CLINIC | Age: 64
End: 2024-07-26
Payer: MEDICARE

## 2024-07-26 VITALS
SYSTOLIC BLOOD PRESSURE: 110 MMHG | WEIGHT: 172 LBS | OXYGEN SATURATION: 98 % | TEMPERATURE: 97.9 F | BODY MASS INDEX: 28.62 KG/M2 | DIASTOLIC BLOOD PRESSURE: 62 MMHG | HEART RATE: 80 BPM

## 2024-07-26 DIAGNOSIS — N50.89 SCROTAL SWELLING: ICD-10-CM

## 2024-07-26 DIAGNOSIS — K70.31 ASCITES DUE TO ALCOHOLIC CIRRHOSIS (MULTI): Primary | ICD-10-CM

## 2024-07-26 LAB
POC APPEARANCE, URINE: CLEAR
POC BILIRUBIN, URINE: NEGATIVE
POC BLOOD, URINE: NEGATIVE
POC COLOR, URINE: YELLOW
POC GLUCOSE, URINE: NEGATIVE MG/DL
POC KETONES, URINE: NEGATIVE MG/DL
POC LEUKOCYTES, URINE: NEGATIVE
POC NITRITE,URINE: NEGATIVE
POC PH, URINE: 6.5 PH
POC PROTEIN, URINE: NEGATIVE MG/DL
POC SPECIFIC GRAVITY, URINE: 1.01
POC UROBILINOGEN, URINE: 0.2 EU/DL

## 2024-07-26 PROCEDURE — 3078F DIAST BP <80 MM HG: CPT | Performed by: NURSE PRACTITIONER

## 2024-07-26 PROCEDURE — 3074F SYST BP LT 130 MM HG: CPT | Performed by: NURSE PRACTITIONER

## 2024-07-26 PROCEDURE — 99213 OFFICE O/P EST LOW 20 MIN: CPT | Performed by: NURSE PRACTITIONER

## 2024-07-26 PROCEDURE — 81002 URINALYSIS NONAUTO W/O SCOPE: CPT | Performed by: NURSE PRACTITIONER

## 2024-07-26 PROCEDURE — 3061F NEG MICROALBUMINURIA REV: CPT | Performed by: NURSE PRACTITIONER

## 2024-07-26 ASSESSMENT — PAIN SCALES - GENERAL: PAINLEVEL: 0-NO PAIN

## 2024-07-26 ASSESSMENT — PATIENT HEALTH QUESTIONNAIRE - PHQ9
SUM OF ALL RESPONSES TO PHQ9 QUESTIONS 1 AND 2: 0
2. FEELING DOWN, DEPRESSED OR HOPELESS: NOT AT ALL
1. LITTLE INTEREST OR PLEASURE IN DOING THINGS: NOT AT ALL

## 2024-07-26 ASSESSMENT — ENCOUNTER SYMPTOMS: DIFFICULTY URINATING: 0

## 2024-07-26 NOTE — PATIENT INSTRUCTIONS
Please go to the emergency department for evaluation due to your abdominal distention and scrotal swelling likely related to ascites.

## 2024-07-26 NOTE — PROGRESS NOTES
Subjective   Patient ID: Vishnu Hassan is a 63 y.o. male who presents for Groin Swelling (X5 days).    HPI   Vishnu is a 64 yo M who presents with swollen scrotum for past 5 days. He reports no difficulty with urination, fever or pain.   He has hx of ascites and had recent admission with paracentesis    Review of Systems   Constitutional:  Negative for chills and fever.   Respiratory:  Negative for cough, chest tightness, shortness of breath and stridor.    Cardiovascular:  Negative for chest pain and leg swelling.   Gastrointestinal:  Positive for abdominal distention. Negative for abdominal pain, constipation, diarrhea, nausea and vomiting.   Genitourinary:  Positive for scrotal swelling. Negative for decreased urine volume, difficulty urinating, dysuria, frequency, hematuria, testicular pain and urgency.   Musculoskeletal: Negative.    Skin: Negative.    Neurological: Negative.        Objective   /62 (BP Location: Left arm, Patient Position: Sitting)   Pulse 80   Temp 36.6 °C (97.9 °F) (Temporal)   Wt 78 kg (172 lb)   SpO2 98%   BMI 28.62 kg/m²     Physical Exam  alert and oriented x3, NAD  Neck supple with no JVD  Lungs CTA bilaterally  Heart with RRR with +`1 nonpitting  edema.  Abd distended, mildly tympanic. Nontender  Large scrotal swelling. Unable to assess penis. No tenderness  skin warm and dry  Neuro grossly intact     Assessment/Plan   Diagnoses and all orders for this visit:  Ascites due to alcoholic cirrhosis (Multi)  Scrotal swelling  -     POCT UA (nonautomated) manually resulted    Urine normal  Concern for extending ascites based on assessment  Pt is advised ED for further evaluation and treatment  Pt in agreement and will go to [facility] mentor. Attempted report - placed on hold for extended time

## 2024-08-03 ASSESSMENT — ENCOUNTER SYMPTOMS
COUGH: 0
NEUROLOGICAL NEGATIVE: 1
DIARRHEA: 0
FEVER: 0
ABDOMINAL DISTENTION: 1
MUSCULOSKELETAL NEGATIVE: 1
DYSURIA: 0
FREQUENCY: 0
CHILLS: 0
CHEST TIGHTNESS: 0
CONSTIPATION: 0
STRIDOR: 0
VOMITING: 0
HEMATURIA: 0
ABDOMINAL PAIN: 0
SHORTNESS OF BREATH: 0
NAUSEA: 0

## 2024-08-09 ENCOUNTER — TELEPHONE (OUTPATIENT)
Dept: PRIMARY CARE | Facility: CLINIC | Age: 64
End: 2024-08-09
Payer: MEDICARE

## 2024-08-09 DIAGNOSIS — I10 ESSENTIAL (PRIMARY) HYPERTENSION: ICD-10-CM

## 2024-08-09 DIAGNOSIS — G89.29 OTHER CHRONIC PAIN: ICD-10-CM

## 2024-08-09 RX ORDER — FEBUXOSTAT 40 MG/1
40 TABLET, FILM COATED ORAL DAILY
Qty: 90 TABLET | Refills: 1 | Status: SHIPPED | OUTPATIENT
Start: 2024-08-09 | End: 2025-02-05

## 2024-08-09 RX ORDER — METOPROLOL TARTRATE 25 MG/1
25 TABLET, FILM COATED ORAL 2 TIMES DAILY
Qty: 180 TABLET | Refills: 1 | Status: SHIPPED | OUTPATIENT
Start: 2024-08-09 | End: 2025-02-05

## 2024-08-09 RX ORDER — TRAMADOL HYDROCHLORIDE 50 MG/1
50 TABLET ORAL EVERY 12 HOURS PRN
Qty: 30 TABLET | Refills: 0 | OUTPATIENT
Start: 2024-08-09 | End: 2024-09-08

## 2024-08-09 NOTE — TELEPHONE ENCOUNTER
Rx Refill Request Telephone Encounter    Name:  Vishnu Hassan  :  [id number]  Medication Name:   Febuxostat 40 mg, Metoprolol 25 mg, Tramadol 50 mg            [facility]  Date of last appointment:    Date of next appointment:    Best number to reach patient:

## 2024-08-12 ENCOUNTER — TELEPHONE (OUTPATIENT)
Dept: PRIMARY CARE | Facility: CLINIC | Age: 64
End: 2024-08-12
Payer: MEDICARE

## 2024-08-12 DIAGNOSIS — G89.29 OTHER CHRONIC PAIN: ICD-10-CM

## 2024-08-12 NOTE — TELEPHONE ENCOUNTER
Please find out how often pt taking med - has been getting 30 pills monthly past 6 mos - last fill of 30 pills was 7/23 so should not be out yet.

## 2024-08-13 ENCOUNTER — TELEPHONE (OUTPATIENT)
Dept: PRIMARY CARE | Facility: CLINIC | Age: 64
End: 2024-08-13
Payer: MEDICARE

## 2024-08-13 RX ORDER — TRAMADOL HYDROCHLORIDE 50 MG/1
50 TABLET ORAL EVERY 12 HOURS PRN
Qty: 45 TABLET | Refills: 0 | Status: SHIPPED | OUTPATIENT
Start: 2024-08-13 | End: 2024-09-12

## 2024-08-13 NOTE — TELEPHONE ENCOUNTER
Patient called requesting a refill for Tramadol 50 mg. He has called previously for this to be refilled - he reports that he has had to take 2 pills a day at times in the last month because of the pain from his recent fall, so he only has two pills left. The instructions say he can take one tablet every 12 hours if needed and has needed to at times due to pain from the recent fall.  Pharmacy: [facility]

## 2024-08-21 ENCOUNTER — PATIENT OUTREACH (OUTPATIENT)
Dept: PRIMARY CARE | Facility: CLINIC | Age: 64
End: 2024-08-21
Payer: MEDICARE

## 2024-08-21 DIAGNOSIS — I10 ESSENTIAL (PRIMARY) HYPERTENSION: ICD-10-CM

## 2024-08-21 DIAGNOSIS — G89.29 OTHER CHRONIC PAIN: ICD-10-CM

## 2024-08-21 DIAGNOSIS — N50.89 SCROTAL SWELLING: ICD-10-CM

## 2024-08-21 DIAGNOSIS — M17.0 PRIMARY OSTEOARTHRITIS OF BOTH KNEES: ICD-10-CM

## 2024-08-21 NOTE — PROGRESS NOTES
Chart reviewed prior to call. During last call he stated that he had unusual scrotal swelling. He had gone to the ER and said two days later it went down. No further problems. Stated leg edema is slightly better but he still has to rest and elevate to make sure it doesn't increase. Pain improved slightly but chronic pain still present in all joints. States he does still get abdominal bloating and he isn't sure if he will have to get any fluid drained at his next appointment on 9/13 with GI. Next appointment with Dr. Samuels on 9/23.

## 2024-08-26 ENCOUNTER — TELEPHONE (OUTPATIENT)
Dept: PRIMARY CARE | Facility: CLINIC | Age: 64
End: 2024-08-26
Payer: MEDICARE

## 2024-08-26 DIAGNOSIS — K70.30 ALCOHOLIC CIRRHOSIS, UNSPECIFIED WHETHER ASCITES PRESENT (MULTI): ICD-10-CM

## 2024-08-26 NOTE — TELEPHONE ENCOUNTER
Rx Refill Request Telephone Encounter    Name:  Vishnu Hassan  :  [id number]  Medication Name:   Lactulose 10 gm, will be out at end of week             Specific Pharmacy location:  [facility]  Date of last appointment:    Date of next appointment:    Best number to reach patient:

## 2024-08-27 RX ORDER — LACTULOSE 10 G/15ML
15 SOLUTION ORAL 2 TIMES DAILY
Qty: 900 ML | Refills: 0 | Status: SHIPPED | OUTPATIENT
Start: 2024-08-27

## 2024-09-19 ENCOUNTER — LAB (OUTPATIENT)
Dept: LAB | Facility: LAB | Age: 64
End: 2024-09-19
Payer: MEDICARE

## 2024-09-19 DIAGNOSIS — I10 ESSENTIAL (PRIMARY) HYPERTENSION: ICD-10-CM

## 2024-09-19 DIAGNOSIS — Z12.5 SCREENING FOR PROSTATE CANCER: ICD-10-CM

## 2024-09-19 LAB
ALBUMIN SERPL BCP-MCNC: 2.8 G/DL (ref 3.4–5)
ALP SERPL-CCNC: 167 U/L (ref 33–136)
ALT SERPL W P-5'-P-CCNC: 16 U/L (ref 10–52)
ANION GAP SERPL CALCULATED.3IONS-SCNC: 11 MMOL/L (ref 10–20)
AST SERPL W P-5'-P-CCNC: 32 U/L (ref 9–39)
BASOPHILS # BLD AUTO: 0.03 X10*3/UL (ref 0–0.1)
BASOPHILS NFR BLD AUTO: 0.8 %
BILIRUB SERPL-MCNC: 1.6 MG/DL (ref 0–1.2)
BUN SERPL-MCNC: 14 MG/DL (ref 6–23)
CALCIUM SERPL-MCNC: 7.8 MG/DL (ref 8.6–10.3)
CHLORIDE SERPL-SCNC: 103 MMOL/L (ref 98–107)
CHOLEST SERPL-MCNC: 120 MG/DL (ref 0–199)
CHOLEST/HDLC SERPL: 2.7 {RATIO}
CO2 SERPL-SCNC: 26 MMOL/L (ref 21–32)
CREAT SERPL-MCNC: 1.12 MG/DL (ref 0.5–1.3)
EGFRCR SERPLBLD CKD-EPI 2021: 75 ML/MIN/1.73M*2
EOSINOPHIL # BLD AUTO: 0.14 X10*3/UL (ref 0–0.7)
EOSINOPHIL NFR BLD AUTO: 3.9 %
ERYTHROCYTE [DISTWIDTH] IN BLOOD BY AUTOMATED COUNT: 16.6 % (ref 11.5–14.5)
GLUCOSE SERPL-MCNC: 101 MG/DL (ref 74–99)
HCT VFR BLD AUTO: 34.6 % (ref 41–52)
HDLC SERPL-MCNC: 43.8 MG/DL
HGB BLD-MCNC: 10.5 G/DL (ref 13.5–17.5)
IMM GRANULOCYTES # BLD AUTO: 0.02 X10*3/UL (ref 0–0.7)
IMM GRANULOCYTES NFR BLD AUTO: 0.6 % (ref 0–0.9)
LDLC SERPL CALC-MCNC: 59 MG/DL
LYMPHOCYTES # BLD AUTO: 0.5 X10*3/UL (ref 1.2–4.8)
LYMPHOCYTES NFR BLD AUTO: 14 %
MCH RBC QN AUTO: 26.4 PG (ref 26–34)
MCHC RBC AUTO-ENTMCNC: 30.3 G/DL (ref 32–36)
MCV RBC AUTO: 87 FL (ref 80–100)
MONOCYTES # BLD AUTO: 0.38 X10*3/UL (ref 0.1–1)
MONOCYTES NFR BLD AUTO: 10.7 %
NEUTROPHILS # BLD AUTO: 2.49 X10*3/UL (ref 1.2–7.7)
NEUTROPHILS NFR BLD AUTO: 70 %
NON HDL CHOLESTEROL: 76 MG/DL (ref 0–149)
NRBC BLD-RTO: 0 /100 WBCS (ref 0–0)
PLATELET # BLD AUTO: 110 X10*3/UL (ref 150–450)
POTASSIUM SERPL-SCNC: 2.9 MMOL/L (ref 3.5–5.3)
PROT SERPL-MCNC: 6.2 G/DL (ref 6.4–8.2)
RBC # BLD AUTO: 3.98 X10*6/UL (ref 4.5–5.9)
SODIUM SERPL-SCNC: 137 MMOL/L (ref 136–145)
TRIGL SERPL-MCNC: 84 MG/DL (ref 0–149)
VLDL: 17 MG/DL (ref 0–40)
WBC # BLD AUTO: 3.6 X10*3/UL (ref 4.4–11.3)

## 2024-09-19 PROCEDURE — 80053 COMPREHEN METABOLIC PANEL: CPT

## 2024-09-19 PROCEDURE — 36415 COLL VENOUS BLD VENIPUNCTURE: CPT

## 2024-09-19 PROCEDURE — G0103 PSA SCREENING: HCPCS

## 2024-09-19 PROCEDURE — 80061 LIPID PANEL: CPT

## 2024-09-19 PROCEDURE — 85025 COMPLETE CBC W/AUTO DIFF WBC: CPT

## 2024-09-20 LAB — PSA SERPL-MCNC: <0.1 NG/ML

## 2024-09-23 ENCOUNTER — TELEPHONE (OUTPATIENT)
Dept: PRIMARY CARE | Facility: CLINIC | Age: 64
End: 2024-09-23

## 2024-09-23 ENCOUNTER — DOCUMENTATION (OUTPATIENT)
Dept: PRIMARY CARE | Facility: CLINIC | Age: 64
End: 2024-09-23

## 2024-09-23 ENCOUNTER — APPOINTMENT (OUTPATIENT)
Dept: PRIMARY CARE | Facility: CLINIC | Age: 64
End: 2024-09-23
Payer: MEDICARE

## 2024-09-23 VITALS
WEIGHT: 154 LBS | OXYGEN SATURATION: 100 % | HEART RATE: 67 BPM | BODY MASS INDEX: 25.63 KG/M2 | DIASTOLIC BLOOD PRESSURE: 76 MMHG | SYSTOLIC BLOOD PRESSURE: 122 MMHG | TEMPERATURE: 97.1 F

## 2024-09-23 DIAGNOSIS — M1A.9XX0 CHRONIC GOUT WITHOUT TOPHUS, UNSPECIFIED CAUSE, UNSPECIFIED SITE: ICD-10-CM

## 2024-09-23 DIAGNOSIS — I10 ESSENTIAL (PRIMARY) HYPERTENSION: ICD-10-CM

## 2024-09-23 DIAGNOSIS — Z09 HOSPITAL DISCHARGE FOLLOW-UP: ICD-10-CM

## 2024-09-23 DIAGNOSIS — E03.9 HYPOTHYROIDISM, UNSPECIFIED TYPE: ICD-10-CM

## 2024-09-23 DIAGNOSIS — Z00.00 ROUTINE GENERAL MEDICAL EXAMINATION AT A HEALTH CARE FACILITY: Primary | ICD-10-CM

## 2024-09-23 DIAGNOSIS — K74.60 HEPATIC CIRRHOSIS, UNSPECIFIED HEPATIC CIRRHOSIS TYPE, UNSPECIFIED WHETHER ASCITES PRESENT (MULTI): ICD-10-CM

## 2024-09-23 DIAGNOSIS — M16.0 PRIMARY OSTEOARTHRITIS OF BOTH HIPS: ICD-10-CM

## 2024-09-23 DIAGNOSIS — K21.9 GASTROESOPHAGEAL REFLUX DISEASE, UNSPECIFIED WHETHER ESOPHAGITIS PRESENT: ICD-10-CM

## 2024-09-23 PROCEDURE — 3048F LDL-C <100 MG/DL: CPT | Performed by: STUDENT IN AN ORGANIZED HEALTH CARE EDUCATION/TRAINING PROGRAM

## 2024-09-23 PROCEDURE — 99495 TRANSJ CARE MGMT MOD F2F 14D: CPT | Performed by: STUDENT IN AN ORGANIZED HEALTH CARE EDUCATION/TRAINING PROGRAM

## 2024-09-23 PROCEDURE — G0439 PPPS, SUBSEQ VISIT: HCPCS | Performed by: STUDENT IN AN ORGANIZED HEALTH CARE EDUCATION/TRAINING PROGRAM

## 2024-09-23 PROCEDURE — 90662 IIV NO PRSV INCREASED AG IM: CPT | Performed by: STUDENT IN AN ORGANIZED HEALTH CARE EDUCATION/TRAINING PROGRAM

## 2024-09-23 PROCEDURE — 3074F SYST BP LT 130 MM HG: CPT | Performed by: STUDENT IN AN ORGANIZED HEALTH CARE EDUCATION/TRAINING PROGRAM

## 2024-09-23 PROCEDURE — 3078F DIAST BP <80 MM HG: CPT | Performed by: STUDENT IN AN ORGANIZED HEALTH CARE EDUCATION/TRAINING PROGRAM

## 2024-09-23 PROCEDURE — G0008 ADMIN INFLUENZA VIRUS VAC: HCPCS | Performed by: STUDENT IN AN ORGANIZED HEALTH CARE EDUCATION/TRAINING PROGRAM

## 2024-09-23 PROCEDURE — 3061F NEG MICROALBUMINURIA REV: CPT | Performed by: STUDENT IN AN ORGANIZED HEALTH CARE EDUCATION/TRAINING PROGRAM

## 2024-09-23 RX ORDER — TRAMADOL HYDROCHLORIDE 50 MG/1
50 TABLET ORAL EVERY 12 HOURS PRN
COMMUNITY
End: 2024-09-24 | Stop reason: SDUPTHER

## 2024-09-23 RX ORDER — OMEPRAZOLE 40 MG/1
40 CAPSULE, DELAYED RELEASE ORAL
Qty: 180 CAPSULE | Refills: 0 | Status: SHIPPED | OUTPATIENT
Start: 2024-09-23

## 2024-09-23 ASSESSMENT — ACTIVITIES OF DAILY LIVING (ADL)
BATHING: INDEPENDENT
MANAGING_FINANCES: INDEPENDENT
TAKING_MEDICATION: INDEPENDENT
DRESSING: INDEPENDENT
DOING_HOUSEWORK: INDEPENDENT
GROCERY_SHOPPING: INDEPENDENT

## 2024-09-23 ASSESSMENT — PAIN SCALES - GENERAL: PAINLEVEL: 5

## 2024-09-23 ASSESSMENT — ENCOUNTER SYMPTOMS
DEPRESSION: 0
OCCASIONAL FEELINGS OF UNSTEADINESS: 0
LOSS OF SENSATION IN FEET: 0

## 2024-09-23 ASSESSMENT — COLUMBIA-SUICIDE SEVERITY RATING SCALE - C-SSRS
1. IN THE PAST MONTH, HAVE YOU WISHED YOU WERE DEAD OR WISHED YOU COULD GO TO SLEEP AND NOT WAKE UP?: NO
2. HAVE YOU ACTUALLY HAD ANY THOUGHTS OF KILLING YOURSELF?: NO
6. HAVE YOU EVER DONE ANYTHING, STARTED TO DO ANYTHING, OR PREPARED TO DO ANYTHING TO END YOUR LIFE?: NO

## 2024-09-23 NOTE — PROGRESS NOTES
Discharge facility: [place]  Discharge diagnosis: Hypokalemia  Admission date: 9/19/24  Discharge date: 9/20/24    PCP Appointment Date: 9/23/24  Specialist Appointment Date:   Hospital Encounter and Summary: Linked   See Discharge assessment below for further details      Medications  Medications reviewed with patient/caregiver?: Yes (9/23/2024  9:58 AM)  Is the patient having any side effects they believe may be caused by any medication additions or changes?: No (9/23/2024  9:58 AM)  Does the patient have all medications ordered at discharge?: Yes (9/23/2024  9:58 AM)  Is the patient taking all medications as directed (includes completed medication regime)?: Yes (9/23/2024  9:58 AM)    Appointments  Does the patient have a primary care provider?: Yes (9/23/2024  9:58 AM)  Has the patient kept scheduled appointments due by today?: Yes (9/23/2024  9:58 AM)  Care Management Interventions: Advised patient to keep appointment (9/23/2024  9:58 AM)     Vishnu is currently sitting in PCP office waiting to be seen. Will follow up in a few weeks.

## 2024-09-23 NOTE — PROGRESS NOTES
Subjective   Reason for Visit: Vishnu Hassan is an 63 y.o. male here for a Medicare Wellness visit. He also presents for hospital follow up.     Past Medical, Surgical, and Family History reviewed and updated in chart.    Reviewed all medications by prescribing practitioner or clinical pharmacist (such as prescriptions, OTCs, herbal therapies and supplements) and documented in the medical record.    HPI  INTERVAL HX/CURRENT CONCERNS: Recent hospitalization for hypokalemia, see TCM note for further detail    CHRONIC CONDITIONS:  HTN  Hypothyroidism   Alcohol induced cirrhosis - quit regular EtOH use 2015.  c/b grade 2 esophageal varices, ascites, pancytopenia. Paracentesis 6/2024. On lasix and spironolactone, lactulose  waiting to establish with new GI, had appt set up for this month but was changed to 10/2024  CKD  Spinal stenosis lumbar  Gout  Chronic pain 2/2 hip and knee OA  Hx Rt hip fracture 12/2022 - non-surgical management    #CHRONIC PAIN - hips, knees s/p replacements. Rt hip fracture 12/2022. Continues on tramadol 50mg one tablet daily. Pain well controlled on this regimen and is able to maintain daily activities and independence with ADL's. When he does not take medication he has difficulty ambulating and cannot complete daily tasks. `     I have personally reviewed the OARRS report.  I have considered the risks of abuse, dependence, addiction and diversion.  I believe that it is clinically appropriate for patient to be prescribed this medication.       Health Maintenance  Immunizations:     Tdap - 2022    Pneumococcal - 2022    Shingles - will have at pharmacy     COVID - discussed    Influenza  - today    Colonoscopy: 6/2024 while hospitalized  Lung cancer screening: NA    Male Specific   PSA 0.01   AAA screen: NA      Patient Care Team:  Eusebia Samuels MD as PCP - General (Family Medicine)  Eusebia Samuels MD as PCP - [employer] PCP  Genoveva Mccullough as Care Manager (Case Management)      Review of Systems   Constitutional:  Negative for chills and fever.   Eyes:  Negative for visual disturbance.   Respiratory:  Negative for shortness of breath.    Cardiovascular:  Negative for chest pain and palpitations.   Gastrointestinal:  Negative for abdominal pain, blood in stool, constipation, diarrhea and nausea.   Genitourinary:  Negative for dysuria.   Musculoskeletal:  Positive for back pain.   Skin:  Negative for rash.   Neurological:  Negative for headaches.   Psychiatric/Behavioral:  Negative for dysphoric mood.        Objective   Vitals:  /76   Pulse 67   Temp 36.2 °C (97.1 °F)   Wt 69.9 kg (154 lb)   SpO2 100%   BMI 25.63 kg/m²       Physical Exam  Constitutional:       General: He is not in acute distress.  HENT:      Right Ear: Tympanic membrane, ear canal and external ear normal.      Left Ear: Tympanic membrane, ear canal and external ear normal.      Mouth/Throat:      Mouth: Mucous membranes are moist.      Pharynx: Oropharynx is clear.   Eyes:      Extraocular Movements: Extraocular movements intact.      Conjunctiva/sclera: Conjunctivae normal.   Cardiovascular:      Rate and Rhythm: Normal rate and regular rhythm.   Pulmonary:      Effort: Pulmonary effort is normal.      Breath sounds: Normal breath sounds.   Abdominal:      Palpations: Abdomen is soft.      Tenderness: There is no abdominal tenderness.   Skin:     General: Skin is warm and dry.   Neurological:      Mental Status: He is alert.   Psychiatric:         Mood and Affect: Mood normal.         Behavior: Behavior normal.         Assessment & Plan  Routine general medical examination at a health care facility  Well adult exam.  1. Age appropriate preventative measures reviewed.   2. Encouraged healthy diet and exercise as tolerated.  3. Immunizations- Reviewed and discussed  4. Labs- Reviewed and discussed with patient  5. Medications- Reviewed         Hepatic cirrhosis, unspecified hepatic cirrhosis type, unspecified  whether ascites present (Multi)  Continues to abstain from alcohol. Will follow up with GI as scheduled. Continue medications as directed, changes made during prior hospitalization.        Essential (primary) hypertension  Controlled. Stable renal function. Continue medication as directed.        Gastroesophageal reflux disease, unspecified whether esophagitis present  GERD precautions. Continue PPI as directed.   Orders:    omeprazole (PriLOSEC) 40 mg DR capsule; Take 1 capsule (40 mg) by mouth once daily in the morning. Take before meals.    Chronic gout without tophus, unspecified cause, unspecified site  No recent flares since starting prophylactic therapy.  Orders:    colchicine 0.6 mg tablet; Take 1 tablet (0.6 mg) by mouth once daily.    Primary osteoarthritis of both hips  Dosing and appropriate use discussed.   Aware if feels she needs increase in dose or frequency will need to be referred to pain management.   Discussed potential issues with tramadol related to his liver disease. Will keep medication regimen same for time being and reassess/adjust as indicated.   Precautions reviewed. Tolerating well without adverse affects.   No red flags.   I have personally reviewed the OARRS report.  I have considered the risks of abuse, dependence, addiction and diversion.  I believe that it is clinically appropriate for patient to be prescribed this medication.     Orders:    traMADol (Ultram) 50 mg tablet; Take 1 tablet (50 mg) by mouth every 12 hours if needed for severe pain (7 - 10).    Hospital discharge follow-up  See other note

## 2024-09-23 NOTE — PROGRESS NOTES
"Patient: Vishnu Hassan  : 1960  PCP: Eusebia Samuels MD  MRN: [id number]  Program: Chronic Care Management (CMS)  Status: Enrolled  Effective Dates: 2024 - present  Responsible Staff: Genoveva Mccullough  Social Determinants to be Addressed: No information to display    Transitional Care Management  Status: Enrolled  Effective Dates: 2024 - present  Responsible Staff: Genoveva Mccullough  Social Determinants to be Addressed: Social Connections, Alcohol Use, Financial Resource Strain, Stress, Physical Activity         Vishnu Hassan is a 63 y.o. male presenting today for follow-up after being discharged from the hospital 3 days ago. The main problem requiring admission was hypokalemia. The discharge summary and/or Transitional Care Management documentation was reviewed. Medication reconciliation was performed as indicated via the \"Mane as Reviewed\" timestamp.     Vishnu Hassan was contacted by Transitional Care Management services two days after his discharge. This encounter and supporting documentation was reviewed.    He presented to [facility] ED  after being advised by another provider at this office when his potassium level returned at 2.9 on his pre-appt labs for his CPE appt. Repeat level 2.7. He was admitted and his potassium level was repleted with IV and oral potassium. Magnesium level also corrected. Felt due to diuretic use. Endorsed good PO intake. Lasix was held. Nephrology was consulted. Spironolactone dose increased to 50 mg daily from 25 mg. Lasix resumed. GI was consulted and recommended OP liver transplant evaluation for MELD 17 (2024).     Medication changes while hospitalized now taking spironolactone 50 mg (was on 25 mg) and continued on same dose of Lasix.   He had labs done this AM. He had drawn at [facility] lab. No results available in chart at this time.     Has follow up appt with GI - has appt scheduled at this time 10/25/2024 but he was told they are arranging an earlier " follow up for him - he is waiting for a follow up call on that. He is also waiting for call from IR for scheduling outpatient paracentesis.     Medication changes  -Spironolactone increased to 50 mg       /76   Pulse 67   Temp 36.2 °C (97.1 °F)   Wt 69.9 kg (154 lb)   SpO2 100%   BMI 25.63 kg/m²     Physical Exam  Constitutional:       General: He is not in acute distress.  Cardiovascular:      Rate and Rhythm: Normal rate and regular rhythm.   Pulmonary:      Effort: Pulmonary effort is normal.      Breath sounds: Normal breath sounds.   Abdominal:      General: There is distension.      Palpations: Abdomen is soft.      Tenderness: There is no abdominal tenderness.   Neurological:      Mental Status: He is alert.         The complexity of medical decision making for this patient's transitional care is moderate.    Assessment/Plan   Hospital records reviewed and discussed. Medication list reviewed. He is aware of and following recommended medication adjustments. He has a number to call IR for scheduling of his paracentesis, he will call tomorrow if he does not get a call today. He will follow up with GI as scheduled. Labs drawn this AM. Will await results to ensure his potassium and magnesium remain at appropriate level.

## 2024-09-24 ENCOUNTER — TELEPHONE (OUTPATIENT)
Dept: PRIMARY CARE | Facility: CLINIC | Age: 64
End: 2024-09-24
Payer: MEDICARE

## 2024-09-24 RX ORDER — COLCHICINE 0.6 MG/1
0.6 TABLET ORAL DAILY
Qty: 90 TABLET | Refills: 1 | Status: SHIPPED | OUTPATIENT
Start: 2024-09-24 | End: 2025-03-23

## 2024-09-24 RX ORDER — TRAMADOL HYDROCHLORIDE 50 MG/1
50 TABLET ORAL EVERY 12 HOURS PRN
Qty: 30 TABLET | Refills: 0 | Status: SHIPPED | OUTPATIENT
Start: 2024-09-24

## 2024-09-24 NOTE — TELEPHONE ENCOUNTER
Patient was in yesterday 09/23 with [facility]. Medications were supposed to be called in to [facility] in [facility]. Pharmacy said they did not have them. Tramadol 50 mg, and Colchicine 0.6 mg

## 2024-09-24 NOTE — TELEPHONE ENCOUNTER
Patient stated he told SJB that he would call regarding lab information. Patient stated his Potassium level is 3.3, he doesn't have the Magnesium level yet.      Patient can be reached at [phone]

## 2024-09-29 ASSESSMENT — ENCOUNTER SYMPTOMS
DYSURIA: 0
SHORTNESS OF BREATH: 0
ABDOMINAL PAIN: 0
BLOOD IN STOOL: 0
CHILLS: 0
CONSTIPATION: 0
FEVER: 0
DIARRHEA: 0
HEADACHES: 0
BACK PAIN: 1
NAUSEA: 0
PALPITATIONS: 0
DYSPHORIC MOOD: 0

## 2024-09-30 NOTE — ASSESSMENT & PLAN NOTE
Continues to abstain from alcohol. Will follow up with GI as scheduled. Continue medications as directed, changes made during prior hospitalization.

## 2024-10-10 ENCOUNTER — TELEPHONE (OUTPATIENT)
Dept: PRIMARY CARE | Facility: CLINIC | Age: 64
End: 2024-10-10
Payer: MEDICARE

## 2024-10-10 DIAGNOSIS — E87.6 HYPOKALEMIA: Primary | ICD-10-CM

## 2024-10-10 NOTE — TELEPHONE ENCOUNTER
Pt called  [phone] his potassium was low in the past does Dr Samuels think he needs to take a supplement ? He had no SX in the past was seen on his labs does no tfeel any SX now

## 2024-10-11 NOTE — TELEPHONE ENCOUNTER
The medication changes from hospitalization should be helping with the low potassium. I put in an order to check the level and we can determine if need to take supplement based on results.

## 2024-10-22 ENCOUNTER — TELEPHONE (OUTPATIENT)
Dept: PRIMARY CARE | Facility: CLINIC | Age: 64
End: 2024-10-22
Payer: MEDICARE

## 2024-10-22 DIAGNOSIS — M16.0 PRIMARY OSTEOARTHRITIS OF BOTH HIPS: ICD-10-CM

## 2024-10-22 DIAGNOSIS — K70.30 ALCOHOLIC CIRRHOSIS, UNSPECIFIED WHETHER ASCITES PRESENT (MULTI): ICD-10-CM

## 2024-10-22 RX ORDER — TRAMADOL HYDROCHLORIDE 50 MG/1
50 TABLET ORAL EVERY 12 HOURS PRN
Qty: 30 TABLET | Refills: 0 | Status: SHIPPED | OUTPATIENT
Start: 2024-10-22 | End: 2024-11-21

## 2024-10-22 RX ORDER — LACTULOSE 10 G/15ML
15 SOLUTION ORAL 2 TIMES DAILY
Qty: 900 ML | Refills: 0 | Status: SHIPPED | OUTPATIENT
Start: 2024-10-22

## 2024-10-22 NOTE — TELEPHONE ENCOUNTER
Patient called for refill [phone]  Lactulose 10 gram/15 mL (15 mL) solution  Tramadol 50mg tabs  [facility]

## 2024-10-29 ENCOUNTER — DOCUMENTATION (OUTPATIENT)
Dept: PRIMARY CARE | Facility: CLINIC | Age: 64
End: 2024-10-29
Payer: MEDICARE

## 2024-11-06 ENCOUNTER — TELEPHONE (OUTPATIENT)
Dept: PRIMARY CARE | Facility: CLINIC | Age: 64
End: 2024-11-06
Payer: MEDICARE

## 2024-11-06 DIAGNOSIS — I10 ESSENTIAL (PRIMARY) HYPERTENSION: ICD-10-CM

## 2024-11-06 NOTE — TELEPHONE ENCOUNTER
Rx Refill Request Telephone Encounter    Name:  Vishnu Hassan  :  [id number]  Medication Name:  Furosemide 80 mg            Specific Pharmacy location:   [facility]  Date of last appointment:    Date of next appointment:    Best number to reach patient:

## 2024-11-07 RX ORDER — FUROSEMIDE 80 MG/1
80 TABLET ORAL DAILY
Qty: 90 TABLET | Refills: 0 | Status: SHIPPED | OUTPATIENT
Start: 2024-11-07

## 2024-11-15 ENCOUNTER — DOCUMENTATION (OUTPATIENT)
Dept: PRIMARY CARE | Facility: CLINIC | Age: 64
End: 2024-11-15
Payer: MEDICARE

## 2024-11-19 ENCOUNTER — TELEPHONE (OUTPATIENT)
Dept: PRIMARY CARE | Facility: CLINIC | Age: 64
End: 2024-11-19
Payer: MEDICARE

## 2024-11-19 DIAGNOSIS — M16.0 PRIMARY OSTEOARTHRITIS OF BOTH HIPS: ICD-10-CM

## 2024-11-19 DIAGNOSIS — K21.9 GASTROESOPHAGEAL REFLUX DISEASE, UNSPECIFIED WHETHER ESOPHAGITIS PRESENT: ICD-10-CM

## 2024-11-19 RX ORDER — OMEPRAZOLE 40 MG/1
40 CAPSULE, DELAYED RELEASE ORAL
Qty: 180 CAPSULE | Refills: 0 | Status: SHIPPED | OUTPATIENT
Start: 2024-11-19

## 2024-11-19 RX ORDER — TRAMADOL HYDROCHLORIDE 50 MG/1
50 TABLET ORAL EVERY 12 HOURS PRN
Qty: 30 TABLET | Refills: 0 | Status: SHIPPED | OUTPATIENT
Start: 2024-11-19 | End: 2024-12-19

## 2024-11-19 NOTE — TELEPHONE ENCOUNTER
Patient called for refills [phone]  [facility]  omeprazole 40mg DR capsule  &  Tramadol 50mg tablet

## 2024-12-20 ENCOUNTER — APPOINTMENT (OUTPATIENT)
Dept: PRIMARY CARE | Facility: CLINIC | Age: 64
End: 2024-12-20
Payer: MEDICARE

## 2024-12-23 ENCOUNTER — TELEPHONE (OUTPATIENT)
Dept: PRIMARY CARE | Facility: CLINIC | Age: 64
End: 2024-12-23
Payer: MEDICARE

## 2024-12-23 DIAGNOSIS — I10 ESSENTIAL (PRIMARY) HYPERTENSION: ICD-10-CM

## 2024-12-23 DIAGNOSIS — M16.0 PRIMARY OSTEOARTHRITIS OF BOTH HIPS: ICD-10-CM

## 2024-12-23 RX ORDER — SPIRONOLACTONE 50 MG/1
50 TABLET, FILM COATED ORAL DAILY
Qty: 90 TABLET | Refills: 1 | Status: SHIPPED | OUTPATIENT
Start: 2024-12-23

## 2024-12-23 RX ORDER — TRAMADOL HYDROCHLORIDE 50 MG/1
50 TABLET ORAL EVERY 12 HOURS PRN
Qty: 30 TABLET | Refills: 0 | Status: SHIPPED | OUTPATIENT
Start: 2024-12-23 | End: 2025-01-22

## 2024-12-23 NOTE — TELEPHONE ENCOUNTER
Refills for    Spironolactone 50 MG    Tramadol 50 MG    [facility]    Patient can be reached at [phone]

## 2025-01-06 ENCOUNTER — APPOINTMENT (OUTPATIENT)
Dept: PRIMARY CARE | Facility: CLINIC | Age: 65
End: 2025-01-06
Payer: MEDICARE

## 2025-01-09 ENCOUNTER — TELEPHONE (OUTPATIENT)
Dept: PRIMARY CARE | Facility: CLINIC | Age: 65
End: 2025-01-09
Payer: MEDICARE

## 2025-01-09 DIAGNOSIS — K70.30 ALCOHOLIC CIRRHOSIS, UNSPECIFIED WHETHER ASCITES PRESENT (MULTI): ICD-10-CM

## 2025-01-09 RX ORDER — LACTULOSE 10 G/15ML
15 SOLUTION ORAL 2 TIMES DAILY
Qty: 900 ML | Refills: 0 | Status: SHIPPED | OUTPATIENT
Start: 2025-01-09

## 2025-01-16 ENCOUNTER — APPOINTMENT (OUTPATIENT)
Dept: PRIMARY CARE | Facility: CLINIC | Age: 65
End: 2025-01-16
Payer: MEDICARE

## 2025-01-24 ENCOUNTER — TELEPHONE (OUTPATIENT)
Dept: PRIMARY CARE | Facility: CLINIC | Age: 65
End: 2025-01-24
Payer: MEDICARE

## 2025-01-24 DIAGNOSIS — M16.0 PRIMARY OSTEOARTHRITIS OF BOTH HIPS: ICD-10-CM

## 2025-01-24 RX ORDER — TRAMADOL HYDROCHLORIDE 50 MG/1
50 TABLET ORAL EVERY 12 HOURS PRN
Qty: 30 TABLET | Refills: 0 | Status: SHIPPED | OUTPATIENT
Start: 2025-01-24 | End: 2025-02-23

## 2025-01-24 NOTE — TELEPHONE ENCOUNTER
Overdue for med review - needs seen every 3 mos - cancelled last 3 appts. Send back once appt made.

## 2025-02-03 ENCOUNTER — TELEPHONE (OUTPATIENT)
Dept: PRIMARY CARE | Facility: CLINIC | Age: 65
End: 2025-02-03

## 2025-02-03 ENCOUNTER — APPOINTMENT (OUTPATIENT)
Dept: PRIMARY CARE | Facility: CLINIC | Age: 65
End: 2025-02-03
Payer: MEDICARE

## 2025-02-03 VITALS
HEIGHT: 66 IN | HEART RATE: 61 BPM | OXYGEN SATURATION: 99 % | WEIGHT: 160 LBS | SYSTOLIC BLOOD PRESSURE: 138 MMHG | BODY MASS INDEX: 25.71 KG/M2 | DIASTOLIC BLOOD PRESSURE: 78 MMHG

## 2025-02-03 DIAGNOSIS — M16.0 PRIMARY OSTEOARTHRITIS OF BOTH HIPS: ICD-10-CM

## 2025-02-03 DIAGNOSIS — M17.0 PRIMARY OSTEOARTHRITIS OF BOTH KNEES: Primary | ICD-10-CM

## 2025-02-03 DIAGNOSIS — Z79.899 LONG-TERM USE OF HIGH-RISK MEDICATION: ICD-10-CM

## 2025-02-03 DIAGNOSIS — I10 PRIMARY HYPERTENSION: ICD-10-CM

## 2025-02-03 PROBLEM — K74.60 HEPATIC CIRRHOSIS (MULTI): Status: ACTIVE | Noted: 2025-02-03

## 2025-02-03 PROBLEM — E83.42 HYPOMAGNESEMIA: Status: ACTIVE | Noted: 2024-09-20

## 2025-02-03 PROBLEM — E87.6 ACUTE HYPOKALEMIA: Status: ACTIVE | Noted: 2024-09-19

## 2025-02-03 PROBLEM — R60.1 GENERALIZED EDEMA: Status: ACTIVE | Noted: 2025-02-03

## 2025-02-03 PROCEDURE — 3008F BODY MASS INDEX DOCD: CPT | Performed by: STUDENT IN AN ORGANIZED HEALTH CARE EDUCATION/TRAINING PROGRAM

## 2025-02-03 PROCEDURE — 99213 OFFICE O/P EST LOW 20 MIN: CPT | Performed by: STUDENT IN AN ORGANIZED HEALTH CARE EDUCATION/TRAINING PROGRAM

## 2025-02-03 PROCEDURE — G2211 COMPLEX E/M VISIT ADD ON: HCPCS | Performed by: STUDENT IN AN ORGANIZED HEALTH CARE EDUCATION/TRAINING PROGRAM

## 2025-02-03 PROCEDURE — 1036F TOBACCO NON-USER: CPT | Performed by: STUDENT IN AN ORGANIZED HEALTH CARE EDUCATION/TRAINING PROGRAM

## 2025-02-03 PROCEDURE — 3078F DIAST BP <80 MM HG: CPT | Performed by: STUDENT IN AN ORGANIZED HEALTH CARE EDUCATION/TRAINING PROGRAM

## 2025-02-03 PROCEDURE — 3075F SYST BP GE 130 - 139MM HG: CPT | Performed by: STUDENT IN AN ORGANIZED HEALTH CARE EDUCATION/TRAINING PROGRAM

## 2025-02-03 ASSESSMENT — PAIN SCALES - GENERAL: PAINLEVEL_OUTOF10: 6

## 2025-02-03 ASSESSMENT — PATIENT HEALTH QUESTIONNAIRE - PHQ9
SUM OF ALL RESPONSES TO PHQ9 QUESTIONS 1 AND 2: 0
1. LITTLE INTEREST OR PLEASURE IN DOING THINGS: NOT AT ALL
2. FEELING DOWN, DEPRESSED OR HOPELESS: NOT AT ALL

## 2025-02-03 NOTE — PROGRESS NOTES
GENERAL PROGRESS NOTE    Chief Complaint   Patient presents with    Med Management       HPI  Vishnu Hassan is a 64 y.o. male here today for medication review    #CHRONIC PAIN - hips, knees s/p replacements. Rt hip fracture 1/2023. Continues on tramadol 50mg one tablet daily. Pain well controlled on this regimen and is able to maintain daily activities and independence with ADL's. When he does not take medication he has difficulty ambulating and cannot complete daily tasks.      OARRS:  Eusebia Samuels MD on 2/3/2025  3:21 PM  I have personally reviewed the OARRS report for Vishnu Hassan. I have considered the risks of abuse, dependence, addiction and diversion    Is the patient prescribed a combination of a benzodiazepine and opioid?  No    Last Urine Drug Screen / ordered today: No  Recent Results (from the past 8760 hours)   Confirmation Opiate/Opioid/Benzo Prescription Compliance    Collection Time: 02/15/24 11:11 AM   Result Value Ref Range    Clonazepam <25 <25 ng/mL    7-Aminoclonazepam <25 <25 ng/mL    Alprazolam <25 <25 ng/mL    Alpha-Hydroxyalprazolam <25 <25 ng/mL    Midazolam <25 <25 ng/mL    Alpha-Hydroxymidazolam <25 <25 ng/mL    Chlordiazepoxide <25 <25 ng/mL    Diazepam <25 <25 ng/mL    Nordiazepam <25 <25 ng/mL    Temazepam <25 <25 ng/mL    Oxazepam <25 <25 ng/mL    Lorazepam <25 <25 ng/mL    Methadone <25 <25 ng/mL    EDDP <25 <25 ng/mL    6-Acetylmorphine <25 <25 ng/mL    Codeine <50 <50 ng/mL    Hydrocodone <25 <25 ng/mL    Hydromorphone <25 <25 ng/mL    Morphine  <50 <50 ng/mL    Norhydrocodone <25 <25 ng/mL    Noroxycodone <25 <25 ng/mL    Oxycodone <25 <25 ng/mL    Oxymorphone <25 <25 ng/mL    Fentanyl <2.5 <2.5 ng/mL    Norfentanyl <2.5 <2.5 ng/mL    Tramadol >1,000 (H) <50 ng/mL    O-Desmethyltramadol >1,000 (H) <50 ng/mL    Zolpidem <25 <25 ng/mL    Zolpidem Metabolite  (ZCA) <25 <25 ng/mL   Screen Opiate/Opioid/Benzo Prescription Compliance    Collection Time: 02/15/24 11:11 AM   Result Value Ref Range    Creatinine, Urine Random 21.0 20.0 - 370.0 mg/dL    Amphetamine Screen, Urine Presumptive Negative Presumptive Negative    Barbiturate Screen, Urine Presumptive Negative Presumptive Negative    Cannabinoid Screen, Urine Presumptive Positive (A) Presumptive Negative    Cocaine Metabolite Screen, Urine Presumptive Negative Presumptive Negative    PCP Screen, Urine Presumptive Negative Presumptive Negative     N/A    Clinical rationale for not completing a Urine Drug Screen: unable to obtain urine specimen - will collect next OV      Controlled Substance Agreement:  Date of the Last Agreement: 2/15/2024  Reviewed Controlled Substance Agreement including but not limited to the benefits, risks, and alternatives to treatment with a Controlled Substance medication(s).    Opioids:  What is the patient's goal of therapy?Decrease pain and get through daily functions   Is this being achieved with current treatment? yes    I have calculated the patient's Morphine Dose Equivalent (MED): 8.7  I have considered referral to Pain Management and/or a specialist, and do not feel it is necessary at this time.    I feel that it is clinically indicated to continue this current medication regimen after consideration of alternative therapies, and other non-opioid treatment.    Opioid Risk Screening:  Family History of Substance Abuse  Alcohol: 3 (5/30/2024  2:00 PM)  Illegal Drugs: 0 (5/30/2024  2:00 PM)  Prescription Drugs: 0 (5/30/2024  2:00 PM)    Personal History of Substance Abuse  Alcohol: 3 (5/30/2024  2:00 PM)  Illegal Drugs: 0 (5/30/2024  2:00 PM)  Prescription Drugs: 0 (5/30/2024  2:00 PM)    Patient Age (16-45)  Age (16-45): 0 (5/30/2024  2:00 PM)    History of Preadolescent Sexual Abuse  History of Preadolescent Sexual Abuse: .0 (5/30/2024  2:00 PM)    Psychological Disease  Attention  "Deficit Disorder, Obsessive Compulsive Disorder, Bipolar, Schizophrenia: 0 (5/30/2024  2:00 PM)  Depression: 0 (5/30/2024  2:00 PM)    Total Score  Total Score: 6 (5/30/2024  2:00 PM)    Total Score Risk Category  TOTAL SCORE CATEGORY: Moderate Risk (4-7) (5/30/2024  2:00 PM)        Pain Assessment:  No data recorded    Vital signs   /78 (BP Location: Left arm, Patient Position: Sitting, BP Cuff Size: Adult)   Pulse 61   Ht 1.676 m (5' 6\")   Wt 72.6 kg (160 lb)   SpO2 99%   BMI 25.82 kg/m²      Current Outpatient Medications   Medication Sig Dispense Refill    colchicine 0.6 mg tablet Take 1 tablet (0.6 mg) by mouth once daily. 90 tablet 1    febuxostat (Uloric) 40 mg tablet Take 1 tablet (40 mg) by mouth once daily. 90 tablet 1    ferrous gluconate (Fergon) 324 (38 Fe) mg tablet Take 1 tablet (38 mg of iron) by mouth once daily with breakfast.      furosemide (Lasix) 80 mg tablet Take 1 tablet (80 mg) by mouth once daily. 90 tablet 0    lactulose 10 gram/15 mL (15 mL) solution Take 15 mL by mouth 2 times a day. 900 mL 0    metoprolol tartrate (Lopressor) 25 mg tablet Take 1 tablet (25 mg) by mouth 2 times a day. 180 tablet 1    omeprazole (PriLOSEC) 40 mg DR capsule Take 1 capsule (40 mg) by mouth once daily in the morning. Take before meals. 180 capsule 0    spironolactone (Aldactone) 50 mg tablet Take 1 tablet (50 mg) by mouth once daily. 90 tablet 1    traMADol (Ultram) 50 mg tablet Take 1 tablet (50 mg) by mouth every 12 hours if needed for severe pain (7 - 10). 30 tablet 0     No current facility-administered medications for this visit.       Review of Systems   All other systems have been reviewed and are negative except as noted in the HPI.       Physical Exam  Nursing notes and vitals reviewed  General appearance - AAOx3, non distressed  CVS - Warm and well perfused  RESP - No respiratory distress  PSYCH - Normal thought content, fluent and appropriate speech      ASSESSMENT AND PLAN  Problem " List Items Addressed This Visit    None  Visit Diagnoses       Primary osteoarthritis of both knees    -  Primary    Primary osteoarthritis of both hips        Long-term use of high-risk medication                Dosing and appropriate use discussed.   Aware if feels she needs increase in dose or frequency will need to be referred to pain management.   Discussed potential issues with tramadol related to his liver disease. Will keep medication regimen same for time being and reassess/adjust as indicated.   Precautions reviewed. Tolerating well without adverse affects.   No red flags.   I have personally reviewed the OARRS report.  I have considered the risks of abuse, dependence, addiction and diversion.  I believe that it is clinically appropriate for patient to be prescribed this medication.     Eusebia Samuels MD  02/03/25  3:16 PM

## 2025-02-12 ENCOUNTER — TELEPHONE (OUTPATIENT)
Dept: PRIMARY CARE | Facility: CLINIC | Age: 65
End: 2025-02-12
Payer: MEDICARE

## 2025-02-12 DIAGNOSIS — G89.29 OTHER CHRONIC PAIN: ICD-10-CM

## 2025-02-12 DIAGNOSIS — I10 ESSENTIAL (PRIMARY) HYPERTENSION: ICD-10-CM

## 2025-02-12 DIAGNOSIS — K21.9 GASTROESOPHAGEAL REFLUX DISEASE, UNSPECIFIED WHETHER ESOPHAGITIS PRESENT: ICD-10-CM

## 2025-02-12 RX ORDER — FEBUXOSTAT 40 MG/1
40 TABLET, FILM COATED ORAL DAILY
Qty: 90 TABLET | Refills: 0 | Status: SHIPPED | OUTPATIENT
Start: 2025-02-12 | End: 2025-05-13

## 2025-02-12 RX ORDER — FUROSEMIDE 80 MG/1
80 TABLET ORAL DAILY
Qty: 90 TABLET | Refills: 0 | Status: SHIPPED | OUTPATIENT
Start: 2025-02-12

## 2025-02-12 RX ORDER — OMEPRAZOLE 40 MG/1
40 CAPSULE, DELAYED RELEASE ORAL
Qty: 90 CAPSULE | Refills: 0 | Status: SHIPPED | OUTPATIENT
Start: 2025-02-12

## 2025-02-12 RX ORDER — METOPROLOL TARTRATE 25 MG/1
25 TABLET, FILM COATED ORAL 2 TIMES DAILY
Qty: 180 TABLET | Refills: 0 | Status: SHIPPED | OUTPATIENT
Start: 2025-02-12 | End: 2025-05-13

## 2025-02-12 NOTE — TELEPHONE ENCOUNTER
Med refill on Omeprazole, Febuxostat, Metoprolol, and Furosemide  Send to [facility] in [place]   Last seen on 02/03/2025

## 2025-02-21 ENCOUNTER — TELEPHONE (OUTPATIENT)
Dept: PRIMARY CARE | Facility: CLINIC | Age: 65
End: 2025-02-21
Payer: MEDICARE

## 2025-02-21 DIAGNOSIS — M16.0 PRIMARY OSTEOARTHRITIS OF BOTH HIPS: ICD-10-CM

## 2025-02-21 RX ORDER — TRAMADOL HYDROCHLORIDE 50 MG/1
50 TABLET ORAL EVERY 12 HOURS PRN
Qty: 30 TABLET | Refills: 0 | Status: SHIPPED | OUTPATIENT
Start: 2025-02-21 | End: 2025-03-23

## 2025-03-13 ENCOUNTER — TELEPHONE (OUTPATIENT)
Dept: PRIMARY CARE | Facility: CLINIC | Age: 65
End: 2025-03-13
Payer: MEDICARE

## 2025-03-13 DIAGNOSIS — K70.31 ALCOHOLIC CIRRHOSIS OF LIVER WITH ASCITES (MULTI): Primary | ICD-10-CM

## 2025-03-13 NOTE — TELEPHONE ENCOUNTER
Rx Refill Request Telephone Encounter    Name:  Vishnu Hassan  :  [id number]  Medication Name:  Lactulose 10 gm solution            Specific Pharmacy location:  [facility], [place]  Date of last appointment:  2-3-25  Date of next appointment:  25  Best number to reach patient:               weight-bearing as tolerated

## 2025-03-14 RX ORDER — LACTULOSE 10 G/15ML
10 SOLUTION ORAL 2 TIMES DAILY
Qty: 900 ML | Refills: 2 | Status: SHIPPED | OUTPATIENT
Start: 2025-03-14 | End: 2025-06-12

## 2025-03-24 ENCOUNTER — TELEPHONE (OUTPATIENT)
Dept: PRIMARY CARE | Facility: CLINIC | Age: 65
End: 2025-03-24
Payer: MEDICARE

## 2025-03-24 DIAGNOSIS — M1A.9XX0 CHRONIC GOUT WITHOUT TOPHUS, UNSPECIFIED CAUSE, UNSPECIFIED SITE: ICD-10-CM

## 2025-03-24 DIAGNOSIS — M16.0 PRIMARY OSTEOARTHRITIS OF BOTH HIPS: ICD-10-CM

## 2025-03-24 RX ORDER — TRAMADOL HYDROCHLORIDE 50 MG/1
50 TABLET ORAL EVERY 12 HOURS PRN
Qty: 30 TABLET | Refills: 0 | Status: SHIPPED | OUTPATIENT
Start: 2025-03-24 | End: 2025-04-23

## 2025-03-24 RX ORDER — COLCHICINE 0.6 MG/1
0.6 TABLET ORAL DAILY
Qty: 90 TABLET | Refills: 0 | Status: SHIPPED | OUTPATIENT
Start: 2025-03-24 | End: 2025-09-20

## 2025-03-24 NOTE — TELEPHONE ENCOUNTER
Med refill on Tramadol and Colchicine   Send to [facility] in [place]  Last seen on 02/03/2025

## 2025-04-05 DIAGNOSIS — I10 PRIMARY HYPERTENSION: ICD-10-CM

## 2025-04-22 ENCOUNTER — TELEPHONE (OUTPATIENT)
Dept: PRIMARY CARE | Facility: CLINIC | Age: 65
End: 2025-04-22
Payer: MEDICARE

## 2025-04-22 DIAGNOSIS — M16.0 PRIMARY OSTEOARTHRITIS OF BOTH HIPS: ICD-10-CM

## 2025-04-23 RX ORDER — TRAMADOL HYDROCHLORIDE 50 MG/1
50 TABLET ORAL EVERY 12 HOURS PRN
Qty: 30 TABLET | Refills: 0 | Status: SHIPPED | OUTPATIENT
Start: 2025-04-23 | End: 2025-05-23

## 2025-05-05 ENCOUNTER — APPOINTMENT (OUTPATIENT)
Dept: PRIMARY CARE | Facility: CLINIC | Age: 65
End: 2025-05-05
Payer: MEDICARE

## 2025-05-05 ENCOUNTER — TELEPHONE (OUTPATIENT)
Dept: PRIMARY CARE | Facility: CLINIC | Age: 65
End: 2025-05-05

## 2025-05-05 VITALS
DIASTOLIC BLOOD PRESSURE: 72 MMHG | HEART RATE: 66 BPM | OXYGEN SATURATION: 98 % | WEIGHT: 156 LBS | HEIGHT: 66 IN | BODY MASS INDEX: 25.07 KG/M2 | SYSTOLIC BLOOD PRESSURE: 126 MMHG

## 2025-05-05 DIAGNOSIS — Z00.00 GENERAL MEDICAL EXAM: Primary | ICD-10-CM

## 2025-05-05 DIAGNOSIS — M15.9 GENERALIZED OSTEOARTHRITIS: ICD-10-CM

## 2025-05-05 DIAGNOSIS — Z13.220 LIPID SCREENING: ICD-10-CM

## 2025-05-05 DIAGNOSIS — Z79.899 HIGH RISK MEDICATION USE: ICD-10-CM

## 2025-05-05 DIAGNOSIS — K74.60 HEPATIC CIRRHOSIS, UNSPECIFIED HEPATIC CIRRHOSIS TYPE, UNSPECIFIED WHETHER ASCITES PRESENT (MULTI): ICD-10-CM

## 2025-05-05 DIAGNOSIS — I10 ESSENTIAL (PRIMARY) HYPERTENSION: ICD-10-CM

## 2025-05-05 DIAGNOSIS — I85.00 ESOPHAGEAL VARICES WITHOUT BLEEDING, UNSPECIFIED ESOPHAGEAL VARICES TYPE (MULTI): ICD-10-CM

## 2025-05-05 DIAGNOSIS — M1A.9XX0 CHRONIC GOUT WITHOUT TOPHUS, UNSPECIFIED CAUSE, UNSPECIFIED SITE: ICD-10-CM

## 2025-05-05 DIAGNOSIS — I10 PRIMARY HYPERTENSION: Primary | ICD-10-CM

## 2025-05-05 DIAGNOSIS — E55.9 VITAMIN D DEFICIENCY: ICD-10-CM

## 2025-05-05 DIAGNOSIS — E03.8 SUBCLINICAL HYPOTHYROIDISM: ICD-10-CM

## 2025-05-05 DIAGNOSIS — N18.31 STAGE 3A CHRONIC KIDNEY DISEASE (MULTI): ICD-10-CM

## 2025-05-05 PROBLEM — E11.9 TYPE 2 DIABETES MELLITUS: Status: RESOLVED | Noted: 2018-06-28 | Resolved: 2025-05-05

## 2025-05-05 PROCEDURE — 1036F TOBACCO NON-USER: CPT | Performed by: STUDENT IN AN ORGANIZED HEALTH CARE EDUCATION/TRAINING PROGRAM

## 2025-05-05 PROCEDURE — 3078F DIAST BP <80 MM HG: CPT | Performed by: STUDENT IN AN ORGANIZED HEALTH CARE EDUCATION/TRAINING PROGRAM

## 2025-05-05 PROCEDURE — 3008F BODY MASS INDEX DOCD: CPT | Performed by: STUDENT IN AN ORGANIZED HEALTH CARE EDUCATION/TRAINING PROGRAM

## 2025-05-05 PROCEDURE — 3074F SYST BP LT 130 MM HG: CPT | Performed by: STUDENT IN AN ORGANIZED HEALTH CARE EDUCATION/TRAINING PROGRAM

## 2025-05-05 PROCEDURE — 99214 OFFICE O/P EST MOD 30 MIN: CPT | Performed by: STUDENT IN AN ORGANIZED HEALTH CARE EDUCATION/TRAINING PROGRAM

## 2025-05-05 PROCEDURE — G2211 COMPLEX E/M VISIT ADD ON: HCPCS | Performed by: STUDENT IN AN ORGANIZED HEALTH CARE EDUCATION/TRAINING PROGRAM

## 2025-05-05 RX ORDER — FUROSEMIDE 40 MG/1
1 TABLET ORAL
COMMUNITY
Start: 2025-04-25

## 2025-05-05 RX ORDER — FEBUXOSTAT 40 MG/1
40 TABLET, FILM COATED ORAL DAILY
Qty: 90 TABLET | Refills: 1 | Status: SHIPPED | OUTPATIENT
Start: 2025-05-05 | End: 2025-11-01

## 2025-05-05 ASSESSMENT — ENCOUNTER SYMPTOMS
FEVER: 0
PALPITATIONS: 0
SHORTNESS OF BREATH: 0
HEADACHES: 0
CHILLS: 0

## 2025-05-05 ASSESSMENT — PAIN SCALES - GENERAL: PAINLEVEL_OUTOF10: 6

## 2025-05-05 NOTE — PROGRESS NOTES
GENERAL PROGRESS NOTE    Chief Complaint   Patient presents with    Hypertension     HTN & tramadol follow up.        HPI  Vishnu Hassan is a 64 y.o. male that presents today for HTN follow and medication review for Tramadol. Last visit was 2/3/25. Did not have pre-appt lab draw    INTERVAL HX:   Under GI/hepatology at [facility] for cirrhosis - upcoming EGD scheduled for later this month. Saw Shanti Clancy CNP couple weeks ago. Lasix was decreased to 40 mg to 80 mg due to electrolytes abnormalities - hypomagnesemia, hypokalemia    CHRONIC CONDITIONS:  HTN - continues on lopressor 25 mg BID, lasix 40 mg daily and spironolactone 50 mg (for cirrhosis). BP today 126/72  Hypothyroidism, subclinical - no meds. Last TSH 4.51 2023  Alcohol induced cirrhosis - quit regular EtOH use 2015.  c/b grade 2 esophageal varices, ascites, pancytopenia. Paracentesis 6/2024. On lasix and spironolactone, lactulose  CKD  Spinal stenosis lumbar  Gout  Chronic pain 2/2 hip and knee OA, spinal stenosis  Hx Rt hip fracture 12/2022 - non-surgical management    #CHRONIC PAIN - hips, knees s/p replacements, spinal stenosis. Rt hip fracture 1/2023. Continues on tramadol 50mg 1-2 tablet daily. Pain well controlled on this regimen and is able to maintain daily activities and independence with ADL's. When he does not take medication he has difficulty ambulating and cannot complete daily tasks.        OARRS:  Eusebia Samuels MD on 5/5/2025  2:07 PM  I have personally reviewed the OARRS report for Vishnu Hassan. I have considered the risks of abuse, dependence, addiction and diversion    Is the patient prescribed a combination of a benzodiazepine and opioid?  No    Last Urine Drug Screen / ordered today: Yes  No results found for this or any previous visit (from the past 8760 hours).  N/A    Controlled Substance Agreement:  Date of the  Last Agreement: 5/5/2025    Opioids:  What is the patient's goal of therapy? Decrease pain and get through daily functions   Is this being achieved with current treatment? yes    I have calculated the patient's Morphine Dose Equivalent (MED):   I have considered referral to Pain Management and/or a specialist, and do not feel it is necessary at this time.    I feel that it is clinically indicated to continue this current medication regimen after consideration of alternative therapies, and other non-opioid treatment.    Opioid Risk Screening:  Family History of Substance Abuse  Alcohol: 3 (5/30/2024  2:00 PM)  Illegal Drugs: 0 (5/30/2024  2:00 PM)  Prescription Drugs: 0 (5/30/2024  2:00 PM)    Personal History of Substance Abuse  Alcohol: 3 (5/30/2024  2:00 PM)  Illegal Drugs: 0 (5/30/2024  2:00 PM)  Prescription Drugs: 0 (5/30/2024  2:00 PM)    Patient Age (16-45)  Age (16-45): 0 (5/30/2024  2:00 PM)    History of Preadolescent Sexual Abuse  History of Preadolescent Sexual Abuse: .0 (5/30/2024  2:00 PM)    Psychological Disease  Attention Deficit Disorder, Obsessive Compulsive Disorder, Bipolar, Schizophrenia: 0 (5/30/2024  2:00 PM)  Depression: 0 (5/30/2024  2:00 PM)    Total Score  Total Score: 6 (5/30/2024  2:00 PM)    Total Score Risk Category  TOTAL SCORE CATEGORY: Moderate Risk (4-7) (5/30/2024  2:00 PM)        Pain Assessment:  Analgesia  What was your pain level on average during the past week?: 6  What was your pain level at its worst during the past week?: 8  What percentage of your pain has been relieved during the past week?: -- (20)  Is the amount of pain relief you are now obtaining from your current pain relievers enough to make a real difference in your life?: Y  Query to Clinician: Is the patient's pain relief clinically significant?: Yes    Activities of Daily Living  Physical Functioning: Better  Family Relationships: Better  Social Relationships: Better  Mood: Better  Sleep Patterns:  "Better  Overall Functioning: Better    Adverse Events  Is patient experiencing any side effects from current pain relievers?: N  Patient's Overall Severity of Side Effects: None        Vital signs   /72 (BP Location: Right arm, Patient Position: Sitting, BP Cuff Size: Adult)   Pulse 66   Ht 1.676 m (5' 6\")   Wt 70.8 kg (156 lb)   SpO2 98%   BMI 25.18 kg/m²      Current Medications[1]    Review of Systems   Constitutional:  Negative for chills and fever.   Eyes:  Negative for visual disturbance.   Respiratory:  Negative for shortness of breath.    Cardiovascular:  Negative for chest pain, palpitations and leg swelling.   Neurological:  Negative for headaches.        Physical Exam  Constitutional:       Appearance: Normal appearance.   Cardiovascular:      Rate and Rhythm: Normal rate and regular rhythm.      Heart sounds: Normal heart sounds.   Pulmonary:      Effort: Pulmonary effort is normal.      Breath sounds: Normal breath sounds. No wheezing, rhonchi or rales.   Musculoskeletal:      Right lower leg: No edema.      Left lower leg: No edema.   Neurological:      Mental Status: He is alert.         ASSESSMENT AND PLAN   1. Primary hypertension (Primary)  Well controlled on current meds, no change in management.   Labs to be updated  Reviewed dietary efforts and physical activity    2. Generalized osteoarthritis  3. High risk medication use  Controlled on current regimen  Dosing and appropriate use discussed  Aware if feels she needs increase in dose or frequency will need to be referred to pain management.   Precautions discussed  No red flags identified  OARRS reviewed, controlled med contract updated.  Return 3 mos   - Drug Screen, Urine With Reflex to Confirmation    4. Hepatic cirrhosis, unspecified hepatic cirrhosis type, unspecified whether ascites present (Multi)  Stable disease state. Continue specialist follow  up. Upcoming EGD. Recent decrease in Lasix dose, has not yet started decreased " dose. Will monitor for fluid overload on new dose. Continue to abstain from EtOH.     5. Chronic gout without tophus, unspecified cause, unspecified site  - febuxostat (Uloric) 40 mg tablet; Take 1 tablet (40 mg) by mouth once daily.  Dispense: 90 tablet; Refill: 1    6. Esophageal varices without bleeding, unspecified esophageal varices type (Multi)  Upcoming EGD for surveillance.     7. Stage 3a chronic kidney disease (Multi)  Update labs. Maintain hydration. No NSAIDs.       Eusebia Samuels MD  05/05/25  2:02 PM         [1]   Current Outpatient Medications   Medication Sig Dispense Refill    colchicine 0.6 mg tablet Take 1 tablet (0.6 mg) by mouth once daily. 90 tablet 0    febuxostat (Uloric) 40 mg tablet Take 1 tablet (40 mg) by mouth once daily. 90 tablet 0    ferrous gluconate (Fergon) 324 (38 Fe) mg tablet Take 1 tablet (38 mg of iron) by mouth once daily with breakfast.      furosemide (Lasix) 40 mg tablet Take 1 tablet (40 mg) by mouth early in the morning..      lactulose 10 gram/15 mL (15 mL) solution Take 15 mL by mouth 2 times a day. 900 mL 0    lactulose 20 gram/30 mL oral solution Take 15 mL (10 g) by mouth 2 times a day. 900 mL 2    metoprolol tartrate (Lopressor) 25 mg tablet Take 1 tablet (25 mg) by mouth 2 times a day. 180 tablet 0    omeprazole (PriLOSEC) 40 mg DR capsule Take 1 capsule (40 mg) by mouth once daily in the morning. Take before meals. 90 capsule 0    spironolactone (Aldactone) 50 mg tablet Take 1 tablet (50 mg) by mouth once daily. 90 tablet 1    traMADol (Ultram) 50 mg tablet Take 1 tablet (50 mg) by mouth every 12 hours if needed for severe pain (7 - 10). 30 tablet 0    furosemide (Lasix) 80 mg tablet Take 1 tablet (80 mg) by mouth once daily. (Patient not taking: Reported on 5/5/2025) 90 tablet 0     No current facility-administered medications for this visit.

## 2025-05-07 LAB
AMPHETAMINES UR QL: NEGATIVE NG/ML
BARBITURATES UR QL: NEGATIVE NG/ML
BENZODIAZ UR QL: NEGATIVE NG/ML
BZE UR QL: NEGATIVE NG/ML
CREAT UR-MCNC: 26.2 MG/DL
DRUG SCREEN COMMENT UR-IMP: ABNORMAL
FENTANYL UR QL SCN: NEGATIVE NG/ML
METHADONE UR QL: NEGATIVE NG/ML
NORTRAMADOL UR-MCNC: 976 NG/ML
OPIATES UR QL: NEGATIVE NG/ML
OXIDANTS UR QL: NEGATIVE MCG/ML
OXYCODONE UR QL: NEGATIVE NG/ML
PCP UR QL: NEGATIVE NG/ML
PH UR: 7.2 [PH] (ref 4.5–9)
QUEST 6 ACETYLMORPHINE: NEGATIVE NG/ML
QUEST NOTES AND COMMENTS: ABNORMAL
QUEST ZOLPIDEM: NEGATIVE NG/ML
THC UR QL: POSITIVE NG/ML
THC UR-MCNC: 53 NG/ML
TRAMADOL UR-MCNC: 5103 NG/ML
ZOLPIDEM PHENYL-4-CARB UR CFM-MCNC: NEGATIVE NG/ML

## 2025-05-15 DIAGNOSIS — K21.9 GASTROESOPHAGEAL REFLUX DISEASE, UNSPECIFIED WHETHER ESOPHAGITIS PRESENT: ICD-10-CM

## 2025-05-15 DIAGNOSIS — I10 ESSENTIAL (PRIMARY) HYPERTENSION: ICD-10-CM

## 2025-05-15 DIAGNOSIS — M16.0 PRIMARY OSTEOARTHRITIS OF BOTH HIPS: ICD-10-CM

## 2025-05-15 RX ORDER — TRAMADOL HYDROCHLORIDE 50 MG/1
50 TABLET, FILM COATED ORAL EVERY 12 HOURS PRN
Qty: 30 TABLET | Refills: 0 | Status: CANCELLED | OUTPATIENT
Start: 2025-05-15 | End: 2025-06-14

## 2025-05-15 RX ORDER — OMEPRAZOLE 40 MG/1
40 CAPSULE, DELAYED RELEASE ORAL
Qty: 90 CAPSULE | Refills: 1 | Status: SHIPPED | OUTPATIENT
Start: 2025-05-15

## 2025-05-15 RX ORDER — METOPROLOL TARTRATE 25 MG/1
25 TABLET, FILM COATED ORAL 2 TIMES DAILY
Qty: 180 TABLET | Refills: 1 | Status: SHIPPED | OUTPATIENT
Start: 2025-05-15 | End: 2025-08-13

## 2025-05-15 NOTE — TELEPHONE ENCOUNTER
Refill Metoprolol 25 mg, Omeprazole DR 40 mg, and Tramadol 50 mg to [facility] in [place]. Patient has about a week left.

## 2025-05-16 RX ORDER — OMEPRAZOLE 40 MG/1
CAPSULE, DELAYED RELEASE ORAL
Qty: 90 CAPSULE | Refills: 0 | OUTPATIENT
Start: 2025-05-16

## 2025-05-16 RX ORDER — METOPROLOL TARTRATE 25 MG/1
25 TABLET, FILM COATED ORAL 2 TIMES DAILY
Qty: 180 TABLET | Refills: 0 | OUTPATIENT
Start: 2025-05-16

## 2025-05-22 ENCOUNTER — TELEPHONE (OUTPATIENT)
Dept: PRIMARY CARE | Facility: CLINIC | Age: 65
End: 2025-05-22
Payer: MEDICARE

## 2025-05-22 DIAGNOSIS — M16.0 PRIMARY OSTEOARTHRITIS OF BOTH HIPS: ICD-10-CM

## 2025-05-22 RX ORDER — TRAMADOL HYDROCHLORIDE 50 MG/1
50 TABLET, FILM COATED ORAL EVERY 12 HOURS PRN
Qty: 30 TABLET | Refills: 0 | Status: SHIPPED | OUTPATIENT
Start: 2025-05-22 | End: 2025-06-21

## 2025-05-22 NOTE — TELEPHONE ENCOUNTER
Patient requesting a refill on tramadol 50 mg going to [facility] in [place].    Last OV: 05/05/2025    Contact: [phone]

## 2025-06-23 ENCOUNTER — TELEPHONE (OUTPATIENT)
Dept: PRIMARY CARE | Facility: CLINIC | Age: 65
End: 2025-06-23
Payer: MEDICARE

## 2025-06-23 DIAGNOSIS — M16.0 PRIMARY OSTEOARTHRITIS OF BOTH HIPS: ICD-10-CM

## 2025-06-23 DIAGNOSIS — I10 ESSENTIAL (PRIMARY) HYPERTENSION: ICD-10-CM

## 2025-06-23 DIAGNOSIS — M1A.9XX0 CHRONIC GOUT WITHOUT TOPHUS, UNSPECIFIED CAUSE, UNSPECIFIED SITE: ICD-10-CM

## 2025-06-23 NOTE — TELEPHONE ENCOUNTER
Patient called and requested refills for Spironolactone 50 mg, Colchicine 0.6 mg, and Tramadol 50 mg. Last ov 5/5/2025. Next ov 9/25/2025.  Pharmacy: [facility]

## 2025-06-24 RX ORDER — TRAMADOL HYDROCHLORIDE 50 MG/1
50 TABLET, FILM COATED ORAL EVERY 12 HOURS PRN
Qty: 30 TABLET | Refills: 0 | Status: SHIPPED | OUTPATIENT
Start: 2025-06-24 | End: 2025-07-24

## 2025-06-24 RX ORDER — SPIRONOLACTONE 50 MG/1
50 TABLET, FILM COATED ORAL DAILY
Qty: 90 TABLET | Refills: 0 | Status: SHIPPED | OUTPATIENT
Start: 2025-06-24

## 2025-06-24 RX ORDER — COLCHICINE 0.6 MG/1
0.6 TABLET ORAL DAILY
Qty: 90 TABLET | Refills: 0 | Status: SHIPPED | OUTPATIENT
Start: 2025-06-24 | End: 2025-12-21

## 2025-07-02 ENCOUNTER — PATIENT OUTREACH (OUTPATIENT)
Dept: PRIMARY CARE | Facility: CLINIC | Age: 65
End: 2025-07-02
Payer: MEDICARE

## 2025-07-02 NOTE — PROGRESS NOTES
Discharge Facility:  [facility]     Discharge Diagnosis:    Closed fracture of distal end of right femur, unspecified fracture morphology, sequela - Primary    Closed nondisplaced fracture of condyle of right femur, initial encounter ([facility])    Injury of right knee, initial encounter    Dislocation of right knee, initial encounter    Closed nondisplaced fracture of condyle of right femur ([facility])   Closed fracture of femoral condyle    Dislocation of right knee   Closed dislocation of knee, unspecified part    Injury of right knee   Injury, other and unspecified, knee, leg, ankle, and foot    ABLA (acute blood loss anemia)      Admitting Diagnoses  - documented in this encounter   Admitting Diagnoses  Diagnosis   Closed fracture of distal end of right femur, unspecified fracture morphology, sequela        Admission Date: 6/28/2025   Discharge Date:  7/1/2025     PCP Appointment Date: Tasked to office     Specialist Appointment Date:     Home Health Care   Agency [facility]   Phone# [phone]     Follow Up Appointment Reminders, Questions or Concerns:  Dr. Sharif's office- If you do not already have a scheduled follow-up appointment or you are uncertain then call at [phone] or call the appointment line at [phone] during normal business hours for information regarding your follow up appointment. Alternatively, you may also call the main hospital  at [phone] or [phone] during normal business hours to schedule these appointments. If you have a question that is urgent, and it is after hours please call [phone] and ask the  to page the on-call [facility] resident. If an appointment has been scheduled for you, it will be noted at the bottom of this form, along with any other upcoming appointments you may have.      10/30/2025 12:30 PM Shanti Clancy APRN.CNP [facility]       Hospital Encounter and Summary Linked: Yes     Hospital Encounter  with Melia Carballo MD; Ayleen Bustamante MD; Jaime Lozano MD    See discharge assessment below for further details     Wrap Up  Wrap Up Additional Comments: Patient states HHC will be in home today , PCP office to call and [facility] F/U , patient aware of calling Dr. Sharif's office [facility] F/U aware of all DC instructions , medication changes, states he does not have nicole hose will have them picked up. Patient aware of possible ATB side effects and to call providers for any questions concerns or change in condition. Patient has family support. (7/2/2025  9:59 AM)    Engagement  Call Start Time: 0959 (7/2/2025  9:59 AM)    Medications  Medications reviewed with patient/caregiver?: Yes (7/2/2025  9:59 AM)  Is the patient having any side effects they believe may be caused by any medication additions or changes?: No (7/2/2025  9:59 AM)  Does the patient have all medications ordered at discharge?: Yes (7/2/2025  9:59 AM)  Care Management Interventions: No intervention needed (7/2/2025  9:59 AM)  Prescription Comments: Ordered Medications per [facility] DC Summary as follows, senna (SENOKOT) 8.6 mg tab   Take 2 tablets by mouth daily at bedtime.       07/01/2025    oxyCODONE IR (ROXICODONE) 5 mg immediate release tablet   Indications: Closed nondisplaced fracture of condyle of right femur, initial encounter ([facility]) Take 1 tablet by mouth every 8 hours as needed for up to 7 days.   21 tablet       07/01/2025 07/08/2025  methocarbamol (ROBAXIN) 500 mg tablet   Take 1 tablet by mouth three times a day as needed for up to 10 days.   30 tablet       07/01/2025 07/11/2025  ergocalciferol 50,000 unit capsule (VITAMIN D2, DRISDOL)   Take 1 capsule by mouth two times a week for 23 doses.   23 capsule       07/02/2025 09/07/2025  doxycycline hyclate (VIBRAMYCIN) 100 mg capsule   Take 1 capsule by mouth every 12 hours at 6 am and 6 pm for 81 doses.   60 capsule   1   07/01/2025 08/11/2025  docusate sodium (COLACE) 100 mg capsule    Take 1 capsule by mouth two times a day.       07/01/2025    aspirin, enteric coated (ASPIRIN, ENTERIC COATED) 81 mg EC tablet   Take 1 tablet by mouth two times a day for 28 days.   56 tablet       07/01/2025 07/29/2025  ascorbic acid, vitamin C, (VITAMIN C) 500 mg tablet   Take 1 tablet by mouth two times a day with meals for 25 doses.   25 tablet       07/01/2025 07/14/2025  furosemide (LASIX) 40 mg tablet   Take 2 tablets by mouth once daily.   180 tablet       07/01/2025 (7/2/2025  9:59 AM)  Is the patient taking all medications as directed (includes completed medication regime)?: -- (Pt states has all meds) (7/2/2025  9:59 AM)  Care Management Interventions: Provided patient education (7/2/2025  9:59 AM)  Medication Comments: No questions or concerns at this time (7/2/2025  9:59 AM)    Appointments  Does the patient have a primary care provider?: Yes (7/2/2025  9:59 AM)  Care Management Interventions: Educated patient on importance of making appointment (7/2/2025  9:59 AM)  Care Management Interventions: Advised to schedule with specialist (7/2/2025  9:59 AM)    Self Management  What is the home health agency?: [facility] will be in home today (7/2/2025  9:59 AM)  What Durable Medical Equipment (DME) was ordered?: NA (7/2/2025  9:59 AM)    Patient Teaching  Does the patient have access to their discharge instructions?: Yes (7/2/2025  9:59 AM)  Care Management Interventions: Reviewed instructions with patient (7/2/2025  9:59 AM)  What is the patient's perception of their health status since discharge?: Improving (7/2/2025  9:59 AM)  Is the patient/caregiver able to teach back the hierarchy of who to call/visit for symptoms/problems? PCP, Specialist, Home Health nurse, Urgent Care, ED, 911: Yes (7/2/2025  9:59 AM)

## 2025-07-08 ENCOUNTER — TELEPHONE (OUTPATIENT)
Dept: PRIMARY CARE | Facility: CLINIC | Age: 65
End: 2025-07-08
Payer: MEDICARE

## 2025-07-08 NOTE — TELEPHONE ENCOUNTER
Called patient advised that per Di taking the tramadol while he awaits a call from the surgeon is okay. Patient states due to stomach issues he was told he is not able to take tylenol or ibuprofen. He says he is feeling slightly better today. He will await call from surgeon.

## 2025-07-08 NOTE — TELEPHONE ENCOUNTER
Patient was told what [facility] advised regarding his Pain. Patient stated he called the Surgeon he's waiting for a return call. Patient would like to know how he should take the Tramadol. Should he start with one and increase to two if the pain is still present?    Please advise    Patient can be reached at [phone]

## 2025-07-08 NOTE — TELEPHONE ENCOUNTER
Patient called and stated he's been taking Oxycodone every 8 hours for Pain from Knee Replacement surgery he had at [facility] on 06-29-25. Patient was released on 07-01-25. Patient called his Surgeon and the Surgeon has not returned his call. Patient would like to know if he could have a refill for the Oxycodone or if he could take Tramadol 50 MG. Please advise Patient was told that [facility] was out of the office today, Patient would like to know what he should do for the Pain. He's a [facility] Patient. Call was triaged to [facility] and she advised he needs to contact his surgeon for refills of oxycodone. His pain should be tapering down at this point and he should be able to start moving to tylenol/ibuprofen. If still having that severe of pain then he needs to see surgeon. In the meantime he can take his tramadol if needed

## 2025-07-14 ENCOUNTER — APPOINTMENT (OUTPATIENT)
Dept: PRIMARY CARE | Facility: CLINIC | Age: 65
End: 2025-07-14
Payer: MEDICARE

## 2025-07-14 VITALS
WEIGHT: 144 LBS | OXYGEN SATURATION: 98 % | SYSTOLIC BLOOD PRESSURE: 108 MMHG | BODY MASS INDEX: 23.14 KG/M2 | HEIGHT: 66 IN | DIASTOLIC BLOOD PRESSURE: 60 MMHG | TEMPERATURE: 97.8 F | HEART RATE: 77 BPM

## 2025-07-14 DIAGNOSIS — Z09 HOSPITAL DISCHARGE FOLLOW-UP: Primary | ICD-10-CM

## 2025-07-14 DIAGNOSIS — S72.401D CLOSED FRACTURE OF DISTAL END OF RIGHT FEMUR WITH ROUTINE HEALING, UNSPECIFIED FRACTURE MORPHOLOGY, SUBSEQUENT ENCOUNTER: ICD-10-CM

## 2025-07-14 DIAGNOSIS — K74.60 HEPATIC CIRRHOSIS, UNSPECIFIED HEPATIC CIRRHOSIS TYPE, UNSPECIFIED WHETHER ASCITES PRESENT (MULTI): ICD-10-CM

## 2025-07-14 DIAGNOSIS — Z09 FOLLOW-UP EXAMINATION AFTER TREATMENT OF FRACTURE: ICD-10-CM

## 2025-07-14 PROBLEM — S89.91XA INJURY OF RIGHT KNEE: Status: RESOLVED | Noted: 2025-06-30 | Resolved: 2025-07-14

## 2025-07-14 PROBLEM — Z96.641 PRESENCE OF RIGHT ARTIFICIAL HIP JOINT: Status: RESOLVED | Noted: 2025-07-14 | Resolved: 2025-07-14

## 2025-07-14 PROBLEM — S72.401A CLOSED FRACTURE OF RIGHT DISTAL FEMUR: Status: ACTIVE | Noted: 2025-06-29

## 2025-07-14 PROBLEM — Z96.60 HISTORY OF ARTIFICIAL JOINT: Status: RESOLVED | Noted: 2025-07-14 | Resolved: 2025-07-14

## 2025-07-14 PROBLEM — D62 ABLA (ACUTE BLOOD LOSS ANEMIA): Status: RESOLVED | Noted: 2025-07-01 | Resolved: 2025-07-14

## 2025-07-14 PROBLEM — S83.104A DISLOCATION OF RIGHT KNEE: Status: ACTIVE | Noted: 2025-06-30

## 2025-07-14 PROCEDURE — 3008F BODY MASS INDEX DOCD: CPT | Performed by: STUDENT IN AN ORGANIZED HEALTH CARE EDUCATION/TRAINING PROGRAM

## 2025-07-14 PROCEDURE — 99495 TRANSJ CARE MGMT MOD F2F 14D: CPT | Performed by: STUDENT IN AN ORGANIZED HEALTH CARE EDUCATION/TRAINING PROGRAM

## 2025-07-14 PROCEDURE — 3078F DIAST BP <80 MM HG: CPT | Performed by: STUDENT IN AN ORGANIZED HEALTH CARE EDUCATION/TRAINING PROGRAM

## 2025-07-14 PROCEDURE — 3074F SYST BP LT 130 MM HG: CPT | Performed by: STUDENT IN AN ORGANIZED HEALTH CARE EDUCATION/TRAINING PROGRAM

## 2025-07-14 RX ORDER — ERGOCALCIFEROL 1.25 MG/1
CAPSULE ORAL
COMMUNITY
Start: 2025-07-01

## 2025-07-14 RX ORDER — OXYCODONE HYDROCHLORIDE 5 MG/1
5 TABLET ORAL EVERY 6 HOURS PRN
COMMUNITY
Start: 2025-07-09 | End: 2025-07-16

## 2025-07-14 RX ORDER — DOCUSATE SODIUM 100 MG/1
100 CAPSULE, LIQUID FILLED ORAL 2 TIMES DAILY
COMMUNITY
Start: 2025-07-01

## 2025-07-14 RX ORDER — METHOCARBAMOL 500 MG/1
TABLET, FILM COATED ORAL
COMMUNITY
Start: 2025-07-01

## 2025-07-14 RX ORDER — DOXYCYCLINE 100 MG/1
100 CAPSULE ORAL 2 TIMES DAILY
COMMUNITY
Start: 2025-07-01 | End: 2025-08-11

## 2025-07-14 RX ORDER — ASCORBIC ACID 500 MG
500 TABLET ORAL
COMMUNITY
Start: 2025-07-01 | End: 2025-07-14

## 2025-07-14 RX ORDER — SENNOSIDES 8.6 MG/1
2 TABLET ORAL NIGHTLY
COMMUNITY
Start: 2025-07-01

## 2025-07-14 RX ORDER — ASPIRIN 81 MG/1
1 TABLET ORAL
COMMUNITY
Start: 2025-07-01

## 2025-07-14 ASSESSMENT — PATIENT HEALTH QUESTIONNAIRE - PHQ9
2. FEELING DOWN, DEPRESSED OR HOPELESS: NOT AT ALL
1. LITTLE INTEREST OR PLEASURE IN DOING THINGS: NOT AT ALL
SUM OF ALL RESPONSES TO PHQ9 QUESTIONS 1 AND 2: 0

## 2025-07-14 ASSESSMENT — PAIN SCALES - GENERAL: PAINLEVEL_OUTOF10: 7

## 2025-07-14 NOTE — PROGRESS NOTES
"Patient: Vishnu Hassan \"Mino\"  : 1960  PCP: Eusebia Samuels MD  MRN: [id number]  Program: Transitional Care Management  Status: Enrolled  Effective Dates: 2025 - present  Responsible Staff: Deborah Saldana  Social Drivers to be Addressed: Alcohol Use, Financial Resource Strain, Physical Activity, Social Connections, Stress         Vishnu Hassan \"Elicia" is a 64 y.o. male presenting today for follow-up after being discharged from the hospital 13 days ago. The main problem requiring admission was Rt femur fracture. The discharge summary and/or Transitional Care Management documentation was reviewed. Medication reconciliation was performed as indicated via the \"Mane as Reviewed\" timestamp.     Vishnu Hassan \"Mino\" was contacted by Transitional Care Management services two days after his discharge. This encounter and supporting documentation was reviewed.      Has been doing well since discharge home. Is ambulating with wheeled walker. Is getting home PT. Follows up with orthopedics 25. Continues oxycodone for pain. Holding tramadol while using oxycodone.     R hip fracture few years ago as well, lost footing in the kitchen and went down onto hip, fracturing it.     Review of Systems   Constitutional:  Negative for chills and fever.   Eyes:  Negative for visual disturbance.   Respiratory:  Negative for shortness of breath.    Cardiovascular:  Negative for chest pain, palpitations and leg swelling.   Neurological:  Negative for headaches.       /60 (BP Location: Left arm, Patient Position: Sitting, BP Cuff Size: Adult)   Pulse 77   Temp 36.6 °C (97.8 °F) (Temporal)   Ht 1.676 m (5' 6\")   Wt 65.3 kg (144 lb)   SpO2 98%   BMI 23.24 kg/m²     Physical Exam  Constitutional:       Appearance: Normal appearance.     Cardiovascular:      Rate and Rhythm: Normal rate and regular rhythm.      Heart sounds: Normal heart sounds.   Pulmonary:      Effort: Pulmonary effort is normal.      Breath " sounds: Normal breath sounds. No wheezing, rhonchi or rales.     Musculoskeletal:      Right lower leg: No edema.      Left lower leg: No edema.     Neurological:      Mental Status: He is alert.         The complexity of medical decision making for this patient's transitional care is moderate.    Assessment/Plan   Assessment & Plan  Hospital discharge follow-up         Closed fracture of distal end of right femur with routine healing, unspecified fracture morphology, subsequent encounter         Hospital records reviewed and discussed. No acute concerns identified today. Continue medications as directed. Follow up with orthopedics as scheduled. Continue [facility]. Discussed obtaining Dexa for osteoporosis evaluation given current fracture, hx of hip fracture, and increased risk of bone loss given his cirrhosis diagnosis.

## 2025-07-16 ENCOUNTER — PATIENT OUTREACH (OUTPATIENT)
Dept: PRIMARY CARE | Facility: CLINIC | Age: 65
End: 2025-07-16
Payer: MEDICARE

## 2025-07-16 NOTE — PROGRESS NOTES
Confirmation of at least 2 patient identifiers.    Completed telephonic follow-up with patient after recent visit with PCP  on 7/14, will see Ortho 7/24     Spoke to patient during outreach call.    Patient reports feeling: Improved    Patient has questions or concerns about medications: No    Have all prescribed medications been filled? Yes    Patient has necessary resources to manage their care? Yes    Patient has questions or concerns? No    Next care management follow-up approximately within one month.  Care  information provided to patient.      Encouraged to call providers for any questions concerns or change in condition

## 2025-07-30 ASSESSMENT — ENCOUNTER SYMPTOMS
PALPITATIONS: 0
CHILLS: 0
SHORTNESS OF BREATH: 0
HEADACHES: 0
FEVER: 0

## 2025-08-25 DIAGNOSIS — E55.9 VITAMIN D DEFICIENCY: ICD-10-CM

## 2025-08-25 DIAGNOSIS — E03.8 SUBCLINICAL HYPOTHYROIDISM: ICD-10-CM

## 2025-08-25 DIAGNOSIS — Z13.220 LIPID SCREENING: ICD-10-CM

## 2025-08-25 DIAGNOSIS — I10 ESSENTIAL (PRIMARY) HYPERTENSION: ICD-10-CM

## 2025-08-25 DIAGNOSIS — Z00.00 GENERAL MEDICAL EXAM: ICD-10-CM

## 2025-09-25 ENCOUNTER — APPOINTMENT (OUTPATIENT)
Dept: PRIMARY CARE | Facility: CLINIC | Age: 65
End: 2025-09-25
Payer: MEDICARE